# Patient Record
Sex: FEMALE | Race: OTHER
[De-identification: names, ages, dates, MRNs, and addresses within clinical notes are randomized per-mention and may not be internally consistent; named-entity substitution may affect disease eponyms.]

---

## 2018-03-15 ENCOUNTER — HOSPITAL ENCOUNTER (INPATIENT)
Dept: HOSPITAL 74 - JER | Age: 73
LOS: 3 days | Discharge: HOME | DRG: 683 | End: 2018-03-18
Attending: INTERNAL MEDICINE | Admitting: INTERNAL MEDICINE
Payer: COMMERCIAL

## 2018-03-15 VITALS — BODY MASS INDEX: 22.9 KG/M2

## 2018-03-15 DIAGNOSIS — N17.9: Primary | ICD-10-CM

## 2018-03-15 DIAGNOSIS — I95.9: ICD-10-CM

## 2018-03-15 DIAGNOSIS — E87.1: ICD-10-CM

## 2018-03-15 DIAGNOSIS — J10.1: ICD-10-CM

## 2018-03-15 DIAGNOSIS — E11.9: ICD-10-CM

## 2018-03-15 DIAGNOSIS — I10: ICD-10-CM

## 2018-03-15 DIAGNOSIS — J44.9: ICD-10-CM

## 2018-03-15 DIAGNOSIS — E86.0: ICD-10-CM

## 2018-03-15 DIAGNOSIS — E78.5: ICD-10-CM

## 2018-03-15 DIAGNOSIS — N39.0: ICD-10-CM

## 2018-03-15 DIAGNOSIS — Z87.891: ICD-10-CM

## 2018-03-15 DIAGNOSIS — M81.0: ICD-10-CM

## 2018-03-15 LAB
ALBUMIN SERPL-MCNC: 3.8 G/DL (ref 3.4–5)
ALP SERPL-CCNC: 75 U/L (ref 45–117)
ALT SERPL-CCNC: 20 U/L (ref 12–78)
ANION GAP SERPL CALC-SCNC: 6 MMOL/L (ref 8–16)
APPEARANCE UR: (no result)
APTT BLD: 30.5 SECONDS (ref 26.9–34.4)
AST SERPL-CCNC: 27 U/L (ref 15–37)
BACTERIA #/AREA URNS HPF: (no result) /HPF
BASOPHILS # BLD: 0.8 % (ref 0–2)
BILIRUB SERPL-MCNC: 0.9 MG/DL (ref 0.2–1)
BILIRUB UR STRIP.AUTO-MCNC: NEGATIVE MG/DL
BUN SERPL-MCNC: 24 MG/DL (ref 7–18)
CALCIUM SERPL-MCNC: 9.1 MG/DL (ref 8.5–10.1)
CHLORIDE SERPL-SCNC: 92 MMOL/L (ref 98–107)
CO2 SERPL-SCNC: 29 MMOL/L (ref 21–32)
COLOR UR: (no result)
CREAT SERPL-MCNC: 2 MG/DL (ref 0.55–1.02)
DEPRECATED RDW RBC AUTO: 13.2 % (ref 11.6–15.6)
EOSINOPHIL # BLD: 0.1 % (ref 0–4.5)
EPITH CASTS URNS QL MICRO: (no result) /HPF
GLUCOSE SERPL-MCNC: 100 MG/DL (ref 74–106)
HCT VFR BLD CALC: 41 % (ref 32.4–45.2)
HGB BLD-MCNC: 14 GM/DL (ref 10.7–15.3)
HYALINE CASTS URNS QL MICRO: 22 /LPF
INR BLD: 1.01 (ref 0.82–1.09)
KETONES UR QL STRIP: NEGATIVE
LEUKOCYTE ESTERASE UR QL STRIP.AUTO: (no result)
LYMPHOCYTES # BLD: 23.4 % (ref 8–40)
MCH RBC QN AUTO: 31.1 PG (ref 25.7–33.7)
MCHC RBC AUTO-ENTMCNC: 34 G/DL (ref 32–36)
MCV RBC: 91.6 FL (ref 80–96)
MONOCYTES # BLD AUTO: 10.2 % (ref 3.8–10.2)
MUCOUS THREADS URNS QL MICRO: (no result)
NEUTROPHILS # BLD: 65.5 % (ref 42.8–82.8)
NITRITE UR QL STRIP: NEGATIVE
PH BLDV: 7.36 [PH] (ref 7.32–7.42)
PH UR: 5 [PH] (ref 5–8)
PLATELET # BLD AUTO: 265 K/MM3 (ref 134–434)
PMV BLD: 6.9 FL (ref 7.5–11.1)
POTASSIUM SERPLBLD-SCNC: 4.9 MMOL/L (ref 3.5–5.1)
PROT SERPL-MCNC: 7.5 G/DL (ref 6.4–8.2)
PROT UR QL STRIP: (no result)
PROT UR QL STRIP: (no result)
PT PNL PPP: 11.4 SEC (ref 9.98–11.88)
RBC # BLD AUTO: 4.48 M/MM3 (ref 3.6–5.2)
RBC # UR STRIP: (no result) /UL
SODIUM SERPL-SCNC: 127 MMOL/L (ref 136–145)
SP GR UR: 1.01 (ref 1–1.03)
UROBILINOGEN UR STRIP-MCNC: (no result) MG/DL (ref 0.2–1)
VENOUS PC02: 46.8 MMHG (ref 38–52)
VENOUS PO2: 20.3 MMHG (ref 28–48)
WBC # BLD AUTO: 9.4 K/MM3 (ref 4–10)

## 2018-03-15 RX ADMIN — HEPARIN SODIUM SCH UNIT: 5000 INJECTION, SOLUTION INTRAVENOUS; SUBCUTANEOUS at 23:43

## 2018-03-15 NOTE — PDOC
History of Present Illness





- General


Chief Complaint: Blood Pressure Problem


Stated Complaint: FLU


Time Seen by Provider: 03/15/18 19:19


History Source: Patient, Family


Exam Limitations: No Limitations





- History of Present Illness


Initial Comments: 





03/15/18 20:03


Patient is a 73-year-old female with history of HTN, HLD, her son for complaint 

of coughing 3 days, body ache, chest tightness. States cough is productive of 

sputum, assoc with chest tightness and sob.  (+) bodyache, (+) subjective 

fever.  Went to she her pmd yesterday and was given a MDI but did not improved 

so came to the ED for eval. Denies nausea, vomiting, diarrhea.  Had the Flu 

shot 2 months ago.  (+) smoker 3-4 cig per day.  Went to urgent care today and 

had a flu swab (+) Flu A.  States she was hypotensive and so sent to the ED.   

Patient noted to be hypoxic off oxygen to 92%, and hypotensive.





PMD:  Dr. Van


PMHX:  as above


ALL:  NKDA





GENERAL/CONSTITUTIONAL: [No fever or chills. No weakness. No weight change.]


HEAD, EYES, EARS, NOSE AND THROAT: [No change in vision. No ear pain or 

discharge. No sore throat.]


CARDIOVASCULAR: [No chest pain or shortness of breath.]


RESPIRATORY: [No cough, wheezing, or hemoptysis.]


GASTROINTESTINAL: [No nausea, vomiting, diarrhea or constipation. No rectal 

bleeding.]


GENITOURINARY: [No dysuria, frequency, or change in urination.]


MUSCULOSKELETAL: (+) joint or muscle swelling or pain. No neck or back pain.]


SKIN AND BREASTS: [No rash or easy bruising.]


NEUROLOGIC: [No headache, vertigo, loss of consciousness, or loss of sensation.]


PSYCHIATRIC: [No depression or anxiety.]


ENDOCRINE: [No increased thirst. No abnormal weight change.]


HEMATOLOGIC/LYMPHATIC: [No anemia, easy bleeding, or history of blood clots.]


ALLERGIC/IMMUNOLOGIC: [No hives or skin allergy. No latex allergy.]





GENERAL: [The patient is awake, alert, and fully oriented, in no acute distress

, moist cough.]


HEAD: [Normal with no signs of trauma.]


EYES: [Pupils equal, round and reactive to light, extraocular movements intact, 

sclera anicteric, conjunctiva clear.]


ENT: [Ears normal, nares patent, oropharynx clear without exudates.  Moist 

mucous membranes.]


NECK: [Normal range of motion, supple without lymphadenopathy, JVD, or masses.]


LUNGS: [Breath sounds equal, clear to auscultation bilaterally.  No wheezes, 

and no crackles, hypoxic]


HEART: [Regular rate and rhythm, normal S1 and S2 without murmur, rub.]


ABDOMEN: [Soft, nontender, normoactive bowel sounds.  No guarding, no rebound.  

No masses.]


EXTREMITIES: [Normal range of motion, no edema.  No clubbing or cyanosis. No 

cords, erythema, or tenderness.]


NEUROLOGICAL: [Cranial nerves II through XII grossly intact.  Normal speech, 

normal gait.]


PSYCH: [Normal mood, normal affect.]


SKIN: [Warm, Dry, normal turgor, no rashes or lesions noted.]











Past History





- Past Medical History


Allergies/Adverse Reactions: 


 Allergies











Allergy/AdvReac Type Severity Reaction Status Date / Time


 


No Known Allergies Allergy   Verified 03/15/18 19:07











Home Medications: 


Ambulatory Orders





Atorvastatin Ca [Lipitor] 10 mg PO HS #30 tablet 03/11/17 


Losartan 50Mg/Hctz 12.5MG [Hyzaar -] 1 tab PO DAILY 03/15/18 








Cardiac Disorders: Yes


COPD: No


Diabetes: Yes


HTN: Yes


Hypercholesterolemia: Yes





- Suicide/Smoking/Psychosocial Hx


Smoking History: Former smoker


Have you smoked in the past 12 months: Yes


Number of Cigarettes Smoked Daily: 3


Information on smoking cessation initiated: No


'Breaking Loose' booklet given: 03/10/17


Hx Alcohol Use: No


Drug/Substance Use Hx: No


Substance Use Type: None


Hx Substance Use Treatment: No





*Physical Exam





- Vital Signs


 Last Vital Signs











Temp Pulse Resp BP Pulse Ox


 


 98.7 F   78   17   93/58   95 


 


 03/15/18 19:07  03/15/18 19:43  03/15/18 19:43  03/15/18 19:43  03/15/18 19:43














ED Treatment Course





- LABORATORY


CBC & Chemistry Diagram: 


 03/16/18 07:05





 03/16/18 07:05





- RADIOLOGY


Radiology Studies Ordered: 














 Category Date Time Status


 


 CHEST X-RAY PORTABLE* [RAD] Stat Radiology  03/15/18 19:55 Ordered














Medical Decision Making





- Medical Decision Making





03/15/18 20:09


Patient is a 73-year-old female with history of HTN, HLD, her son for complaint 

of coughing 3 days, body ache, chest tightness, noted to be hypotensive and 

hypoxic on exam. Initiate sepsis workup.


noted off oxygen to be 92%





labs reviewed noted 





03/15/18 21:40


 Laboratory Tests











  03/15/18 03/15/18 03/15/18





  20:20 20:20 20:20


 


WBC  9.4  D  


 


Hgb  14.0  


 


Hct  41.0  


 


Plt Count  265  


 


Sodium   127 L 


 


Potassium   4.9 


 


Chloride   92 L 


 


Carbon Dioxide   29 


 


Anion Gap   6 L 


 


BUN   24 H 


 


Creatinine   2.0 H 


 


Random Glucose   100 


 


Lactic Acid    1.3


 


Troponin I   














  03/15/18





  20:20


 


WBC 


 


Hgb 


 


Hct 


 


Plt Count 


 


Sodium 


 


Potassium 


 


Chloride 


 


Carbon Dioxide 


 


Anion Gap 


 


BUN 


 


Creatinine 


 


Random Glucose 


 


Lactic Acid 


 


Troponin I  < 0.02











CXR haziness at the right base, not change from prior


EKG SR rate 80, NAD, (-) ST-T wave changes, QTc 422





Patient is currently receiving fluids will admit 


for hypoxia with cough, clinically pneumonia


hyponatremia and hypotension





03/15/18 21:45


Given Rocephin 1gm and Zithromax 500mg IV





ASA 325mg po given 


tamiflu 30mg po














*DC/Admit/Observation/Transfer


Diagnosis at time of Disposition: 


 Hypoxia, Hyponatremia





Hypotension


Qualifiers:


 Hypotension type: other hypotension type Qualified Code(s): I95.89 - Other 

hypotension








- Discharge Dispostion


Condition at time of disposition: Stable


Admit: Yes





- Referrals





- Patient Instructions





- Post Discharge Activity

## 2018-03-16 LAB
ANION GAP SERPL CALC-SCNC: 10 MMOL/L (ref 8–16)
BASOPHILS # BLD: 0.4 % (ref 0–2)
BUN SERPL-MCNC: 23 MG/DL (ref 7–18)
CALCIUM SERPL-MCNC: 8 MG/DL (ref 8.5–10.1)
CHLORIDE SERPL-SCNC: 100 MMOL/L (ref 98–107)
CO2 SERPL-SCNC: 23 MMOL/L (ref 21–32)
CREAT SERPL-MCNC: 1.4 MG/DL (ref 0.55–1.02)
DEPRECATED RDW RBC AUTO: 13.1 % (ref 11.6–15.6)
EOSINOPHIL # BLD: 0.3 % (ref 0–4.5)
GLUCOSE SERPL-MCNC: 79 MG/DL (ref 74–106)
HCT VFR BLD CALC: 35.6 % (ref 32.4–45.2)
HGB BLD-MCNC: 12.2 GM/DL (ref 10.7–15.3)
LYMPHOCYTES # BLD: 44.1 % (ref 8–40)
MAGNESIUM SERPL-MCNC: 1.9 MG/DL (ref 1.8–2.4)
MCH RBC QN AUTO: 31.1 PG (ref 25.7–33.7)
MCHC RBC AUTO-ENTMCNC: 34.2 G/DL (ref 32–36)
MCV RBC: 90.8 FL (ref 80–96)
MONOCYTES # BLD AUTO: 9.3 % (ref 3.8–10.2)
NEUTROPHILS # BLD: 45.9 % (ref 42.8–82.8)
PHOSPHATE SERPL-MCNC: 2.8 MG/DL (ref 2.5–4.9)
PLATELET # BLD AUTO: 223 K/MM3 (ref 134–434)
PMV BLD: 6.8 FL (ref 7.5–11.1)
POTASSIUM SERPLBLD-SCNC: 4.1 MMOL/L (ref 3.5–5.1)
RBC # BLD AUTO: 3.92 M/MM3 (ref 3.6–5.2)
SODIUM SERPL-SCNC: 133 MMOL/L (ref 136–145)
WBC # BLD AUTO: 7.2 K/MM3 (ref 4–10)

## 2018-03-16 RX ADMIN — HEPARIN SODIUM SCH UNIT: 5000 INJECTION, SOLUTION INTRAVENOUS; SUBCUTANEOUS at 21:31

## 2018-03-16 RX ADMIN — INSULIN ASPART SCH: 100 INJECTION, SOLUTION INTRAVENOUS; SUBCUTANEOUS at 12:04

## 2018-03-16 RX ADMIN — INSULIN ASPART SCH: 100 INJECTION, SOLUTION INTRAVENOUS; SUBCUTANEOUS at 06:05

## 2018-03-16 RX ADMIN — CEFTRIAXONE SCH MLS/HR: 1 INJECTION, POWDER, FOR SOLUTION INTRAMUSCULAR; INTRAVENOUS at 09:21

## 2018-03-16 RX ADMIN — SODIUM CHLORIDE SCH MLS/HR: 9 INJECTION, SOLUTION INTRAVENOUS at 12:06

## 2018-03-16 RX ADMIN — OSELTAMIVIR PHOSPHATE SCH MG: 30 CAPSULE ORAL at 21:32

## 2018-03-16 RX ADMIN — HEPARIN SODIUM SCH UNIT: 5000 INJECTION, SOLUTION INTRAVENOUS; SUBCUTANEOUS at 09:23

## 2018-03-16 RX ADMIN — SODIUM CHLORIDE SCH MLS/HR: 9 INJECTION, SOLUTION INTRAVENOUS at 04:25

## 2018-03-16 RX ADMIN — INSULIN ASPART SCH: 100 INJECTION, SOLUTION INTRAVENOUS; SUBCUTANEOUS at 17:12

## 2018-03-16 RX ADMIN — INSULIN ASPART SCH UNITS: 100 INJECTION, SOLUTION INTRAVENOUS; SUBCUTANEOUS at 21:31

## 2018-03-16 NOTE — PN
Progress Note, Physician


Chief Complaint: 





patient seen and examined


sitting in bed says her cough is better





- Current Medication List


Current Medications: 


Active Medications





Albuterol/Ipratropium (Duoneb -)  1 amp NEB Q4H PRN


   PRN Reason: SHORTNESS OF BREATH


Heparin Sodium (Porcine) (Heparin -)  5,000 unit SQ BID [facility]


   Last Admin: 03/16/18 09:23 Dose:  5,000 unit


Ceftriaxone Sodium 1 gm/ (Dextrose)  50 mls @ 100 mls/hr IVPB DAILY [facility]


   Last Admin: 03/16/18 09:21 Dose:  100 mls/hr


Sodium Chloride (Normal Saline -)  1,000 mls @ 83 mls/hr IV ASDIR [facility]


   Last Admin: 03/16/18 12:06 Dose:  83 mls/hr


Insulin Aspart (Novolog Vial Sliding Scale -)  1 vial SQ ACHS POLI


   PRN Reason: Protocol


   Last Admin: 03/16/18 12:04 Dose:  Not Given


Oseltamivir Phosphate (Tamiflu -)  30 mg PO DAILY [facility]


   Stop: 03/21/18 09:59


   Last Admin: 03/16/18 11:00 Dose:  30 mg











- Objective


Vital Signs: 


 Vital Signs











Temperature  98.7 F   03/16/18 09:00


 


Pulse Rate  79   03/16/18 09:00


 


Respiratory Rate  20   03/16/18 09:00


 


Blood Pressure  117/63   03/16/18 09:00


 


O2 Sat by Pulse Oximetry (%)  98   03/16/18 09:00











Constitutional: Yes: Calm


Cardiovascular: Yes: Regular Rate and Rhythm, S1, S2


Respiratory: Yes: CTA Bilaterally


Neurological: Yes: Alert, Oriented


Labs: 


 CBC, BMP





 03/16/18 07:05 





 03/16/18 07:05 





 INR, PTT











INR  1.01  (0.82-1.09)   03/15/18  20:20    














Problem List





- Problems


(1) Cough


Assessment/Plan: 


r/o broncitis


legionella negative


flu swab is negative


iv abx





Code(s): R05 - COUGH   





(2) UTI (urinary tract infection)


Assessment/Plan: 


iv rocephin


awaiting final sensitivities





Code(s): N39.0 - URINARY TRACT INFECTION, SITE NOT SPECIFIED   





(3) Hyponatremia


Assessment/Plan: 


ivf - sodium trending upwards


renal sono noted- nornmal





Code(s): E87.1 - HYPO-OSMOLALITY AND HYPONATREMIA   





(4) DM2 (diabetes mellitus, type 2)


Assessment/Plan: 


hgab1c is 6.3


prediabetes


diet control


Code(s): E11.9 - TYPE 2 DIABETES MELLITUS WITHOUT COMPLICATIONS   





(5) LANDON (acute kidney injury)


Assessment/Plan: 


improving with iv hydration cr from 2.0 to 1.4


Code(s): N17.9 - ACUTE KIDNEY FAILURE, UNSPECIFIED

## 2018-03-16 NOTE — PN
Progress Note (short form)





- Note


Progress Note: 





ID Consult dictated


Acute influenza


UTI


LANDON


Await BC 


Continue ceftriaxone


tamiflu


Droplet precautions

## 2018-03-16 NOTE — CONS
DATE OF CONSULTATION:  

 

DATE OF DICTATION:  03/16/2018

 

INFECTIOUS DISEASE CONSULTATION 

 

HISTORY OF PRESENT ILLNESS:  The patient is a 73-year-old female who is evaluated for

acute influenza and urinary tract infection.

 

The patient reports a 3-day history of chest discomfort, back discomfort,

pleuritic-type chest pain, cough productive of whitish sputum and body ache.  She had

been prescribed an inhaler as an outpatient without significant improvement. 

Symptoms worsened.  She presented to an urgent care center where she was noted to be

hypotensive and hypoxemic.  A rapid influenza A screen was positive.  

 

She is presently admitted to the hospital.  She complains of chest and back pain

which is pleuritic in nature, cough productive of whitish sputum.  She has been

afebrile.  Cultures were obtained and she was empirically started on Zithromax,

ceftriaxone, and Tamiflu.  

 

She has urinary frequency and urgency.  She denies dysuria.

 

PAST MEDICAL HISTORY:  Positive for hypertension, hyperlipidemia.

 

ALLERGIES:  No known allergies.

 

MEDICATION:  Lipitor, losartan, hydrochlorothiazide.

SOCIAL HISTORY:  Lives at home with family members.  Positive active tobacco use.  

 

SYSTEMS REVIEW:  

Neurologic:  No loss of consciousness, seizure activity, or focal weakness.

Cardiac:  As per HPI.

Respiratory:  As per HPI.  

Gastrointestinal:  Negative vomiting or diarrhea.

Genitourinary:  As per HPI.

 

LABORATORY:  Lab data, white count on admission 9.4, presently 7.2.  Hematocrit 35.6,

platelet count 223.  BUN 23, creatinine 1.4.  Urinalysis:  17 white cells.  

 

Chest x-ray negative for acute infiltrate.  

 

PHYSICAL EXAMINATION:

General:  She is awake.  She is weak appearing with sinus congestion and cough,

slightly dyspneic at rest.  

Vital signs:  Temperature 98.8, blood pressure 117/57, pulse 81 regular, respirations

18 per minute.   

HEENT:  Sclerae anicteric.  Oropharynx mild injection.  

Neck:  Supple.  No palpable nodes.  

Cardiovascular:  Heart sounds S1, S2. 

Respiratory:  Lungs scattered rhonchi.  No wheezing or rales.  

Abdomen:  Soft.  Nontender.

Extremities:  Negative for edema.  

 

IMPRESSION:

1.  Acute influenza A.

2.  Urinary tract infection.  

3.  Azotemia.  

 

Await culture results.  Continue ceftriaxone for empiric coverage of urinary tract

pathogens.  Tamiflu adjusted for creatinine clearance calculated at 40 with 30 mg

p.o. b.i.d. for 5 days.  _____ precautions.  

 

Will follow.  Thank you for the kind referral.  

 

 

NILA MCCARTHY M.D.

 

BREONNA/[id number]

DD: 03/16/2018 15:32

DT: 03/16/2018 16:08

Job #:  [id number]

## 2018-03-16 NOTE — EKG
Test Reason : 

Blood Pressure : ***/*** mmHG

Vent. Rate : 080 BPM     Atrial Rate : 080 BPM

   P-R Int : 154 ms          QRS Dur : 078 ms

    QT Int : 366 ms       P-R-T Axes : 079 072 076 degrees

   QTc Int : 422 ms

 

*** POOR DATA QUALITY, INTERPRETATION MAY BE ADVERSELY AFFECTED

NORMAL SINUS RHYTHM

POSSIBLE LEFT ATRIAL ENLARGEMENT

ST ELEVATION, CONSIDER EARLY REPOLARIZATION, PERICARDITIS, OR INJURY

WHEN COMPARED WITH ECG OF 10-MAR-2017 00:50,

NO SIGNIFICANT CHANGE WAS FOUND

Confirmed by NILA GOOD MD ([id number]) on 3/16/2018 3:01:55 PM

 

Referred By:             Confirmed By:NILA GOOD MD

## 2018-03-16 NOTE — PN
Teaching Attending Note


Name of Resident: Jaja Nye





ATTENDING PHYSICIAN STATEMENT





I saw and evaluated the patient.


I reviewed the resident's note and discussed the case with the resident.


I agree with the resident's findings and plan as documented.








SUBJECTIVE:


73F smoker presents with cough, body aches, pleuritic chest pain for past 3 

days. went to urgent care where apparently flu was positive.





OBJECTIVE:


NAD


mild late exp wheezes bilaterally


mild suprapubic tenderness





Influenza panel negative


Cr 2 from 1.1 baseline


BUN 24





UA positive





ASSESSMENT AND PLAN:


73F with likely viral bronchitis , possible influenza though our panel was 

negative. complicated by LANDON, likely hypovolemic hyopnatremia


start empric tamiflu, if possible obtain report from urgent care center in AM


/hr


continue ceftriaxone for possible UTI - follow up UCx


Renal US, Urine lytes

## 2018-03-16 NOTE — CON.NEP
Consult


Consult Specialty:: nephrology


Reason for Consultation:: hyponatremia





- History of Present Illness


Chief Complaint: cough/dyspnea


History of Present Illness: 





74 y/o F with PMH HTN, HLD, DM, osteoporosis, who presents to the ED c/o cough, 

myalgias, and chest tightness over the past three days. As per pt, her sx began 

suddenly three days ago and have been a/w neck pain (not stiffness), as well as 

generalized abdominal pain. She endorses productive cough with white sputum (

without blood), as well as loss of appetite during this time.  She was found to 

have influenza A and came to ED via ambulance.  Has no history of ckd or 

hyponatremia.  Was having fevers but no vomting or diarrhea.   





- History Source


History Provided By: Patient, Medical Record


Limitations to Obtaining History: No Limitations





- Past Medical History


Cardio/Vascular: Yes: HTN





- Alcohol/Substance Use


Hx Alcohol Use: No





- Smoking History


Smoking history: Former smoker


Have you smoked in the past 12 months: Yes


Aproximately how many cigarettes per day: 3





Home Medications





- Allergies


Allergies/Adverse Reactions: 


 Allergies











Allergy/AdvReac Type Severity Reaction Status Date / Time


 


No Known Allergies Allergy   Verified 03/15/18 19:07














- Home Medications


Home Medications: 


Ambulatory Orders





Atorvastatin Ca [Lipitor] 10 mg PO HS #30 tablet 03/11/17 


Losartan 50Mg/Hctz 12.5MG [Hyzaar -] 1 tab PO DAILY 03/15/18 











Family Disease History





- Family Disease History


Family History: Unremarkable





Review of Systems





- Review of Systems


Constitutional: reports: Fever


Eyes: reports: No Symptoms


HENT: reports: No Symptoms


Neck: reports: No Symptoms


Cardiovascular: reports: Chest Pain, Shortness of Breath


Respiratory: reports: Cough, SOB


Gastrointestinal: reports: No Symptoms


Genitourinary: reports: No Symptoms


Breasts: reports: No Symptoms Reported


Musculoskeletal: reports: Back Pain, Muscle Cramps


Neurological: reports: No Symptoms


Endocrine: reports: No Symptoms


Hematology/Lymphatic: reports: No Symptoms


Psychiatric: reports: No Symptoms





Nephrology Consult





- Height


Height: 5 ft 10 in





- Weight


Weight: 160 lb





- BMI


Body Mass Index (BMI): 22.9





- Lab Results


CBC,BMP: 


 CBC, BMP





 03/16/18 07:05 





 03/16/18 07:05 








Anion Gap: 


 Anion Gap











Anion Gap  10  (8-16)   03/16/18  07:05    














- Imaging


Chest X-ray: Report Reviewed (no acute infiltrates)


Ultrasound: Report Reviewed (normal)





- Physical Examination


Vital Signs: 


 Vital Signs











Temperature  98.8 F   03/16/18 14:09


 


Pulse Rate  81   03/16/18 14:09


 


Respiratory Rate  18   03/16/18 14:09


 


Blood Pressure  117/57   03/16/18 14:09


 


O2 Sat by Pulse Oximetry (%)  98   03/16/18 09:00











Constitutional: Yes: Well Nourished, No Distress, Calm


Eyes: Yes: Conjunctiva Clear


HENT: Yes: Atraumatic, Normocephalic


Neck: Yes: Supple, Trachea Midline


Cardiovascular: Yes: Regular Rate and Rhythm


Respiratory: Yes: Regular, CTA Bilaterally


Gastrointestinal: Yes: Normal Bowel Sounds


Musculoskeletal: Yes: WNL


Extremities: Yes: WNL


Edema: No


Peripheral Pulses WNL: Yes


Wound/Incision: Yes: Clean/Dry


Neurological: Yes: Alert, Oriented


Psychiatric: Yes: Alert, Oriented





Assessment/Plan





IMPRESSION


LANDON associated with hypotension and hyponatremia; already improved with ivf


influenza testing was pos as outpatient (reportedly) but negative here


does have a urinary tract by UA


low urine osm suggestive of increased water clearance








PLAN


would continue fluids


need to clarify if droplet precautions are necessary as suggested by ID


abx for uti


follow urine cultures


would evaluate for ckd as outpatient





MV

## 2018-03-16 NOTE — HP
CHIEF COMPLAINT: flu like sx x 3 days 





PCP:





HISTORY OF PRESENT ILLNESS:





74 y/o F with PMH HTN, HLD, DM, osteoporosis, who presents to the ED c/o cough, 

myalgias, and chest tightness over the past three days. As per pt, her sx began 

suddenly three days ago and have been a/w neck pain (not stiffness), as well as 

generalized abdominal pain. She endorses productive cough with white sputum (

without blood), as well as loss of appetite during this time. Pt went to her PMD

, Dr. Van yesterday and was given a meter-dose inhaler for sx use, however 

her sx were not alleviated. For this reason, she went to an urgent care today 

for further evaluation, where she tested positive for Flu A and was recommended 

continued follow up in the ED. She also endorses subjective fever and chills. 

Denies SOB, chest pain or pressure, or changes in urinary or bowel function. 





While pt was in the ED, she was stated to be hypotensive at 93/58 (MAP 70) and 

hypoxic on RA. She was started on nonrebreather, then 2-3L via NC where she 

improved to 94-95 sat. 





ER course was notable for:


(1) Aspirin 325mg PO x 1 


(2) Ceftriaxone, azithromycin x 1 


(3) in-house Flu (-)


(4) Tamiflu 30mg x 1





Recent Travel: none





PAST MEDICAL HISTORY: as above 





PAST SURGICAL HISTORY: denies 





Social History: retired; . 


Smoking: smoked for 50 years,  2-3 cigs/day. Still smokes actively, no thoughts 

of quitting


Alcohol: denies


Drugs: denies





Family History: denies





Allergies





No Known Allergies Allergy (Verified 03/15/18 19:07)


 








HOME MEDICATIONS:


 Home Medications











 Medication  Instructions  Recorded


 


Atorvastatin Ca [Lipitor] 10 mg PO HS #30 tablet 03/11/17


 


Losartan 50Mg/Hctz 12.5MG [Hyzaar 1 tab PO DAILY 03/15/18





-]  








REVIEW OF SYSTEMS


CONSTITUTIONAL: +subjective fever, chills, loss of appetite 


Absent:  fever, chills, diaphoresis, generalized weakness, malaise, loss of 

appetite, weight change


HEENT: 


Absent:  rhinorrhea, nasal congestion, throat pain, throat swelling, difficulty 

swallowing, mouth swelling, ear pain, eye pain, visual changes


CARDIOVASCULAR: 


Absent: chest pain, syncope, palpitations, irregular heart rate, lightheadedness

, peripheral edema


RESPIRATORY: +cough 


Absent: cough, shortness of breath, dyspnea with exertion, orthopnea, wheezing, 

stridor, hemoptysis


GASTROINTESTINAL:


Absent: abdominal pain, abdominal distension, nausea, vomiting, diarrhea, 

constipation, melena, hematochezia


GENITOURINARY: 


Absent: dysuria, frequency, urgency, hesitancy, hematuria, flank pain, genital 

pain


MUSCULOSKELETAL: +myalgias 


Absent: myalgia, arthralgia, joint swelling, back pain, neck pain


SKIN: 


Absent: rash, itching, pallor


HEMATOLOGIC/IMMUNOLOGIC: 


Absent: easy bleeding, easy bruising, lymphadenopathy, frequent infections


ENDOCRINE:


Absent: unexplained weight gain, unexplained weight loss, heat intolerance, 

cold intolerance


NEUROLOGIC: 


Absent: headache, focal weakness or paresthesias, dizziness, unsteady gait, 

seizure, mental status changes, bladder or bowel incontinence


PSYCHIATRIC: 


Absent: anxiety, depression, suicidal or homicidal ideation, hallucinations.








PHYSICAL EXAMINATION


 Vital Signs 











  03/15/18 03/15/18 03/15/18





  19:07 19:43 22:42


 


Temperature 98.7 F  


 


Pulse Rate 83  


 


Pulse Rate [  78 





Radial]   


 


Respiratory 20 17 19





Rate   


 


Blood Pressure 92/57  


 


Blood Pressure  93/58 111/56





[Arm]   


 


O2 Sat by Pulse 100 95 98





Oximetry (%)   














  03/15/18





  23:47


 


Temperature 


 


Pulse Rate 


 


Pulse Rate [ 74





Radial] 


 


Respiratory 17





Rate 


 


Blood Pressure 


 


Blood Pressure 95/49





[Arm] 


 


O2 Sat by Pulse 100





Oximetry (%) 











GENERAL: Lying in bed. Awake, alert, and fully oriented, in mild distress. 


HEAD: Normal with no signs of trauma.


EYES: Pupils equal, round and reactive to light, extraocular movements intact, 

sclera anicteric, conjunctiva clear. No lid lag.


EARS, NOSE, THROAT: Ears normal, nares patent, oropharynx clear without 

exudates. 


NECK: Normal range of motion, supple without lymphadenopathy, JVD, or masses.


LUNGS: Breath sounds equal, clear to auscultation bilaterally. wheezing. no 

crackles, No accessory muscle use.


HEART: Regular rate and rhythm, normal S1 and S2 without murmur, rub or gallop.


ABDOMEN: Soft, +BS in all four quadrants. Mildly tender to palpation diffusely.


MUSCULOSKELETAL: Normal range of motion at all joints. No bony deformities or 

tenderness. .


LOWER EXTREMITIES: 2+ posterior tibial pulses, warm, well-perfused. No calf 

tenderness. No peripheral edema. 


NEUROLOGICAL:  Cranial nerves II-XII intact. Normal speech.


 Laboratory Results 











  03/15/18 03/15/18 03/15/18





  20:20 20:20 20:20


 


WBC  9.4  D  


 


RBC  4.48  


 


Hgb  14.0  


 


Hct  41.0  


 


MCV  91.6  


 


MCH  31.1  


 


MCHC  34.0  


 


RDW  13.2  


 


Plt Count  265  


 


MPV  6.9 L  


 


Neutrophils %  65.5  D  


 


Lymphocytes %  23.4  D  


 


Monocytes %  10.2  


 


Eosinophils %  0.1  D  


 


Basophils %  0.8  


 


PT with INR   11.40 


 


INR   1.01 


 


PTT (Actin FS)   30.5 


 


VBG pH    7.36


 


POC VBG pCO2    46.8


 


POC VBG pO2    20.3 L


 


Mixed VBG HCO3    26.0 H


 


Sodium   














  03/15/18 03/15/18 03/15/18





  20:20 20:20 20:20


 


WBC   


 


Mixed VBG HCO3   


 


Sodium  127 L  


 


Potassium  4.9  


 


Chloride  92 L  


 


Carbon Dioxide  29  


 


Anion Gap  6 L  


 


BUN  24 H  


 


Creatinine  2.0 H  


 


Creat Clearance w eGFR  24.29  


 


Random Glucose  100  


 


Lactic Acid   1.3 


 


Calcium  9.1  


 


Total Bilirubin  0.9  


 


AST  27  


 


ALT  20  


 


Alkaline Phosphatase  75  


 


Troponin I    < 0.02


 


Total Protein  7.5  


 


Albumin  3.8  


 


Urine Color   


 


Urine Mucus   














  03/15/18





  22:42


 


WBC 


 


RBC 


 


Hgb 


 


Hct 


 


MCV 


 


MCHC 


 


Monocytes % 


 


Eosinophils % 


 


Basophils % 


 


PT with INR 


 


INR 


 


PTT (Actin FS) 


 


VBG pH 


 


POC VBG pCO2 


 


POC VBG pO2 


 


Mixed VBG HCO3 


 


Sodium 


 


Potassium 


 


Chloride 


 


Carbon Dioxide 


 


Anion Gap 


 


BUN 


 


Creatinine 


 


Creat Clearance w eGFR 


 


Random Glucose 


 


Lactic Acid 


 


Calcium 


 


Total Bilirubin 


 


AST 


 


ALT 


 


Alkaline Phosphatase 


 


Troponin I 


 


Total Protein 


 


Albumin 


 


Urine Color  Elisa


 


Urine Appearance  Cloudy


 


Urine pH  5.0  D


 


Ur Specific Gravity  1.015


 


Urine Protein  2+ H


 


Urine Glucose (UA)  1+ H


 


Urine Ketones  Negative


 


Urine Blood  1+ H


 


Urine Nitrite  Negative


 


Urine Bilirubin  Negative


 


Urine Urobilinogen  4.0 e.u/dl H


 


Ur Leukocyte Esterase  3+ H


 


Urine WBC (Auto)  17


 


Urine RBC (Auto)  7


 


Ur Epithelial Cells  Many


 


Urine Bacteria  Rare


 


Hyaline Casts  22


 


Urine Mucus  Few





EKG: normal sinus, rate 90, QTc 422ms


CXR: no evidence of infiltrate, await official read





ASSESSMENT/PLAN:





74 y/o F with PMH HTN, HLD, DM, osteoporosis, who presents to the ED c/o cough, 

myalgias, and chest tightness over the past three days. Pt admitted to tele 

observation for hypoxia 2/2 potential influenza, URI, as well as UTI.





#Hypoxia 2/2 potential influenza


-Pt hypoxic on RA (new) - sat 91-92%, requiring nonrebreather, 02 NC


-Urgent care Flu (+), however in house Flu test (-)


-Will tx with Tamiflu 30mg PO qd for five day course regardless


-Isolation precautions


-Unlikely PNA, as infiltrates not noted on CXR. Received CAP coverage - azithro

, ceftriaxone x1 in ED. Pt is not septic currently.


-Will f/u Legionella urine Ag


-F/u blood cx, urine cx





#UTI 


-Pt sx with diffuse TTP in suprapubic region, UA with leuk esterase 3+, 17 WBCs


-Received 1 dose ceftriaxone in ED


-Will continue ceftriaxone 1g IVPB qd for 2-3 additional days





#Hypotension 2/2 decreased fluid intake


-Pt currently not hypotensive though BP 90/60's - MAP holding 70


-Will give IVF and reassess





#COPD


-Wheezing, SOB


-Started on duonebs q4h PRN for SOB


-F/u Urine cx 





#LANDON 2/2 decreased fluid intake


-Cr 2, bump up from baseline 1.1


-Gentle hydration IV NS 83 cc/hr 


-F/u Renal sono


-Urine lytes, u osm, serum osm - calculate FEna


-F/u BMP





#Hyponatremia 


-Pt appears hypovolemic


-Gentle hydration IV NS 83 cc/hr 


-Careful to not correct too quickly





#HTN- currently controlled - hypotensive


-Hold meds


-Restart on losartan -HCTZ 50-12.5mg tomorrow





#HLD


-Continue lipitor 10mg PO HS





#DM


-ISS


-BGM


-A1c





#F/E/N


IV NS 83 cc/hr gentle hydration


Monitor electrolytes, mainly Na


HTN diet 





#PPX


DVT: SCD's, early ambulation





#Dispo


observation monitoring 














Visit type





- Emergency Visit


Emergency Visit: Yes


ED Registration Date: 03/15/18


Care time: The patient presented to the Emergency Department on the above date 

and was hospitalized for further evaluation of their emergent condition.





- New Patient


This patient is new to me today: Yes


Date on this admission: 03/16/18





- Critical Care


Critical Care patient: No





Hospitalist Screening





- Colonoscopy Questionnaire


Colonoscopy Questionnaire: 





Colonoscopy Questionnaire








-   Patient:


50 - 75 years old and never had a screening colonoscopy: Unknown


History of colon or rectal polyps, or CA: Unknown


History of IBD, Crohn's disease or UC: Unknown


History of abdominal radiation therapy as a child: Unknown





-   Relative:


1 with colon or rectal CA, or polyps at age 60 or younger: Unknown


Colon or rectal CA diagnosed at age 45 or younger: Unknown


Multiple relatives with colon or rectal CA: Unknown





-   Outcome:


Screening Result: Negative Screen

## 2018-03-17 LAB
ALBUMIN SERPL-MCNC: 3.1 G/DL (ref 3.4–5)
ALP SERPL-CCNC: 63 U/L (ref 45–117)
ALT SERPL-CCNC: 18 U/L (ref 12–78)
ANION GAP SERPL CALC-SCNC: 5 MMOL/L (ref 8–16)
AST SERPL-CCNC: 23 U/L (ref 15–37)
BASOPHILS # BLD: 0.9 % (ref 0–2)
BILIRUB SERPL-MCNC: 0.5 MG/DL (ref 0.2–1)
BUN SERPL-MCNC: 17 MG/DL (ref 7–18)
CALCIUM SERPL-MCNC: 8.4 MG/DL (ref 8.5–10.1)
CHLORIDE SERPL-SCNC: 104 MMOL/L (ref 98–107)
CO2 SERPL-SCNC: 27 MMOL/L (ref 21–32)
CREAT SERPL-MCNC: 1.1 MG/DL (ref 0.55–1.02)
DEPRECATED RDW RBC AUTO: 13.2 % (ref 11.6–15.6)
EOSINOPHIL # BLD: 0.6 % (ref 0–4.5)
GLUCOSE SERPL-MCNC: 83 MG/DL (ref 74–106)
HCT VFR BLD CALC: 35.2 % (ref 32.4–45.2)
HGB BLD-MCNC: 11.9 GM/DL (ref 10.7–15.3)
LYMPHOCYTES # BLD: 46.8 % (ref 8–40)
MCH RBC QN AUTO: 31.1 PG (ref 25.7–33.7)
MCHC RBC AUTO-ENTMCNC: 33.9 G/DL (ref 32–36)
MCV RBC: 91.9 FL (ref 80–96)
MONOCYTES # BLD AUTO: 8.3 % (ref 3.8–10.2)
NEUTROPHILS # BLD: 43.4 % (ref 42.8–82.8)
PLATELET # BLD AUTO: 257 K/MM3 (ref 134–434)
PMV BLD: 7.2 FL (ref 7.5–11.1)
POTASSIUM SERPLBLD-SCNC: 4.8 MMOL/L (ref 3.5–5.1)
PROT SERPL-MCNC: 6.2 G/DL (ref 6.4–8.2)
RBC # BLD AUTO: 3.83 M/MM3 (ref 3.6–5.2)
SODIUM SERPL-SCNC: 136 MMOL/L (ref 136–145)
WBC # BLD AUTO: 6.7 K/MM3 (ref 4–10)

## 2018-03-17 RX ADMIN — SODIUM CHLORIDE SCH MLS/HR: 9 INJECTION, SOLUTION INTRAVENOUS at 15:32

## 2018-03-17 RX ADMIN — INSULIN ASPART SCH: 100 INJECTION, SOLUTION INTRAVENOUS; SUBCUTANEOUS at 22:55

## 2018-03-17 RX ADMIN — INSULIN ASPART SCH: 100 INJECTION, SOLUTION INTRAVENOUS; SUBCUTANEOUS at 17:55

## 2018-03-17 RX ADMIN — OSELTAMIVIR PHOSPHATE SCH MG: 30 CAPSULE ORAL at 09:17

## 2018-03-17 RX ADMIN — CEFTRIAXONE SCH MLS/HR: 1 INJECTION, POWDER, FOR SOLUTION INTRAMUSCULAR; INTRAVENOUS at 09:17

## 2018-03-17 RX ADMIN — INSULIN ASPART SCH: 100 INJECTION, SOLUTION INTRAVENOUS; SUBCUTANEOUS at 11:26

## 2018-03-17 RX ADMIN — OSELTAMIVIR PHOSPHATE SCH MG: 30 CAPSULE ORAL at 22:51

## 2018-03-17 RX ADMIN — INSULIN ASPART SCH: 100 INJECTION, SOLUTION INTRAVENOUS; SUBCUTANEOUS at 06:40

## 2018-03-17 RX ADMIN — HEPARIN SODIUM SCH UNIT: 5000 INJECTION, SOLUTION INTRAVENOUS; SUBCUTANEOUS at 22:51

## 2018-03-17 RX ADMIN — HEPARIN SODIUM SCH UNIT: 5000 INJECTION, SOLUTION INTRAVENOUS; SUBCUTANEOUS at 09:17

## 2018-03-17 NOTE — PN
Progress Note (short form)





- Note


Progress Note: 





RENAL


Says she feels better and wants to leave


not coughing


no nausea or vomiting


no diarrhea


able to eat


says she was not eating or drinking before





 Last Vital Signs











Temp Pulse Resp BP Pulse Ox


 


 99 F   72   19   127/68   98 


 


 03/17/18 06:00  03/17/18 06:00  03/17/18 06:00  03/17/18 06:00  03/16/18 21:00








lungs essentially clear, rare crackles


cvs s1s2 rr


abd soft


ext no edema


neuro a+ox3


skin no rashes or lesions noted





 Current Medications











Generic Name Dose Route Start Last Admin





  Trade Name Freq  PRN Reason Stop Dose Admin


 


Albuterol/Ipratropium  1 amp  03/16/18 04:21  





  Duoneb -  NEB   





  Q4H PRN   





  SHORTNESS OF BREATH   


 


Heparin Sodium (Porcine)  5,000 unit  03/15/18 23:00  03/17/18 09:17





  Heparin -  SQ   5,000 unit





  BID POLI   Administration


 


Ceftriaxone Sodium 1 gm/  50 mls @ 100 mls/hr  03/16/18 10:00  03/17/18 09:17





  Dextrose  IVPB   100 mls/hr





  DAILY POLI   Administration


 


Sodium Chloride  1,000 mls @ 83 mls/hr  03/16/18 04:21  03/16/18 12:06





  Normal Saline -  IV   83 mls/hr





  ASDIR POLI   Administration


 


Insulin Aspart  1 vial  03/16/18 07:00  03/17/18 06:40





  Novolog Vial Sliding Scale -  SQ   Not Given





  ACHS POLI   





  Protocol   


 


Oseltamivir Phosphate  30 mg  03/16/18 22:00  03/17/18 09:17





  Tamiflu -  PO  03/21/18 21:59  30 mg





  BID POLI   Administration








 


 CBC, BMP





 03/17/18 06:59 





 03/17/18 06:59 














IMPRESSION


LANDON associated with hypotension and hyponatremia; already improved with ivf


influenza testing was pos as outpatient (reportedly) but negative here


does have a urinary tract infection  by UA


hyponatremia and landon improved








PLAN


can dc fluids and feed


abx for uti


follow urine cultures


would evaluate for ckd as outpatient


if she needs tamiflu will need to adjust dose

## 2018-03-17 NOTE — PN
Physical Exam: 


SUBJECTIVE: Patient seen and examined. She feels better. She denies cough, SOB.








OBJECTIVE:





 Vital Signs











 Period  Temp  Pulse  Resp  BP Sys/Muñoz  Pulse Ox


 


 Last 24 Hr  98.0 F-99 F  69-81  18-19  106-127/53-96  96-98











GENERAL: The patient is awake, alert, and fully oriented, in no acute distress.


LUNGS: Breath sounds equal, clear to auscultation bilaterally, no wheezes, no 

crackles, no accessory muscle use. 


HEART: Regular rate and rhythm, S1, S2 without murmur, rub or gallop.


ABDOMEN: Soft, nontender, nondistended, normoactive bowel sounds, no guarding, 

no rebound, no hepatosplenomegaly, no masses.


EXTREMITIES: 2+ pulses, warm, well-perfused, no edema. 











 Laboratory Results - last 24 hr











  03/16/18 03/16/18 03/16/18





  12:03 17:10 21:27


 


WBC   


 


RBC   


 


Hgb   


 


Hct   


 


MCV   


 


MCH   


 


MCHC   


 


RDW   


 


Plt Count   


 


MPV   


 


Neutrophils %   


 


Lymphocytes %   


 


Monocytes %   


 


Eosinophils %   


 


Basophils %   


 


Sodium   


 


Potassium   


 


Chloride   


 


Carbon Dioxide   


 


Anion Gap   


 


BUN   


 


Creatinine   


 


Creat Clearance w eGFR   


 


POC Glucometer  91  95  154


 


Random Glucose   


 


Calcium   


 


Total Bilirubin   


 


AST   


 


ALT   


 


Alkaline Phosphatase   


 


Total Protein   


 


Albumin   














  03/17/18 03/17/18 03/17/18





  06:08 06:59 06:59


 


WBC   6.7 


 


RBC   3.83 


 


Hgb   11.9 


 


Hct   35.2 


 


MCV   91.9 


 


MCH   31.1 


 


MCHC   33.9 


 


RDW   13.2 


 


Plt Count   257 


 


MPV   7.2 L 


 


Neutrophils %   43.4 


 


Lymphocytes %   46.8 H 


 


Monocytes %   8.3 


 


Eosinophils %   0.6  D 


 


Basophils %   0.9 


 


Sodium    136


 


Potassium    4.8


 


Chloride    104


 


Carbon Dioxide    27


 


Anion Gap    5 L


 


BUN    17


 


Creatinine    1.1 H


 


Creat Clearance w eGFR    48.42


 


POC Glucometer  93  


 


Random Glucose    83


 


Calcium    8.4 L


 


Total Bilirubin    0.5  D


 


AST    23


 


ALT    18


 


Alkaline Phosphatase    63


 


Total Protein    6.2 L


 


Albumin    3.1 L














  03/17/18





  11:03


 


WBC 


 


RBC 


 


Hgb 


 


Hct 


 


MCV 


 


MCH 


 


MCHC 


 


RDW 


 


Plt Count 


 


MPV 


 


Neutrophils % 


 


Lymphocytes % 


 


Monocytes % 


 


Eosinophils % 


 


Basophils % 


 


Sodium 


 


Potassium 


 


Chloride 


 


Carbon Dioxide 


 


Anion Gap 


 


BUN 


 


Creatinine 


 


Creat Clearance w eGFR 


 


POC Glucometer  97


 


Random Glucose 


 


Calcium 


 


Total Bilirubin 


 


AST 


 


ALT 


 


Alkaline Phosphatase 


 


Total Protein 


 


Albumin 








Active Medications











Generic Name Dose Route Start Last Admin





  Trade Name Freq  PRN Reason Stop Dose Admin


 


Albuterol/Ipratropium  1 amp  03/16/18 04:21  





  Duoneb -  NEB   





  Q4H PRN   





  SHORTNESS OF BREATH   


 


Heparin Sodium (Porcine)  5,000 unit  03/15/18 23:00  03/17/18 09:17





  Heparin -  SQ   5,000 unit





  BID POLI   Administration


 


Ceftriaxone Sodium 1 gm/  50 mls @ 100 mls/hr  03/16/18 10:00  03/17/18 09:17





  Dextrose  IVPB   100 mls/hr





  DAILY POLI   Administration


 


Sodium Chloride  1,000 mls @ 83 mls/hr  03/16/18 04:21  03/16/18 12:06





  Normal Saline -  IV   83 mls/hr





  ASDIR POLI   Administration


 


Insulin Aspart  1 vial  03/16/18 07:00  03/17/18 11:26





  Novolog Vial Sliding Scale -  SQ   Not Given





  ACHS POLI   





  Protocol   


 


Oseltamivir Phosphate  30 mg  03/16/18 22:00  03/17/18 09:17





  Tamiflu -  PO  03/21/18 21:59  30 mg





  BID POLI   Administration











ASSESSMENT/PLAN:


1. Acute kidney injury secondary to dehydration


   - Improved with IV fluid


   - Discontinue IV fluid and repeat BUN, creatinine in AM


2. Hypovolemic hyponatremia


   - Improved


3. Influenza A


   - Complete course of Tamiflu


4. UTI


   - Continue Rocephin (day 2)   


5. Type 2 DM


   - Continue Novolog sliding scale


6. HTN


   - Meds held secondary to hypotension


7. Hyperlipidemia





Visit type





- Emergency Visit


Emergency Visit: Yes


ED Registration Date: 03/15/18


Care time: The patient presented to the Emergency Department on the above date 

and was hospitalized for further evaluation of their emergent condition.





- New Patient


This patient is new to me today: Yes


Date on this admission: 03/17/18





- Critical Care


Critical Care patient: No





- Discharge Referral


Referred to [facility] No

## 2018-03-18 VITALS — HEART RATE: 84 BPM | DIASTOLIC BLOOD PRESSURE: 73 MMHG | TEMPERATURE: 98.4 F | SYSTOLIC BLOOD PRESSURE: 127 MMHG

## 2018-03-18 LAB
ANION GAP SERPL CALC-SCNC: 9 MMOL/L (ref 8–16)
BUN SERPL-MCNC: 12 MG/DL (ref 7–18)
CALCIUM SERPL-MCNC: 9.6 MG/DL (ref 8.5–10.1)
CHLORIDE SERPL-SCNC: 104 MMOL/L (ref 98–107)
CO2 SERPL-SCNC: 26 MMOL/L (ref 21–32)
CREAT SERPL-MCNC: 1.1 MG/DL (ref 0.55–1.02)
GLUCOSE SERPL-MCNC: 80 MG/DL (ref 74–106)
POTASSIUM SERPLBLD-SCNC: 5.3 MMOL/L (ref 3.5–5.1)
SODIUM SERPL-SCNC: 139 MMOL/L (ref 136–145)

## 2018-03-18 RX ADMIN — OSELTAMIVIR PHOSPHATE SCH MG: 30 CAPSULE ORAL at 09:56

## 2018-03-18 RX ADMIN — INSULIN ASPART SCH: 100 INJECTION, SOLUTION INTRAVENOUS; SUBCUTANEOUS at 06:35

## 2018-03-18 RX ADMIN — CEFTRIAXONE SCH MLS/HR: 1 INJECTION, POWDER, FOR SOLUTION INTRAMUSCULAR; INTRAVENOUS at 09:55

## 2018-03-18 RX ADMIN — HEPARIN SODIUM SCH UNIT: 5000 INJECTION, SOLUTION INTRAVENOUS; SUBCUTANEOUS at 09:56

## 2018-03-18 NOTE — PN
Progress Note (short form)





- Note


Progress Note: 





RENAL


Says she feels better and wants to leave


not coughing


no nausea or vomiting


no diarrhea


able to eat








 


 Last Vital Signs











Temp Pulse Resp BP Pulse Ox


 


 98.9 F   80   16   148/83   95 


 


 03/18/18 06:00  03/18/18 06:00  03/18/18 06:00  03/18/18 06:00  03/17/18 21:00

















lungs essentially clear


cvs s1s2 rr


abd soft


ext no edema


neuro a+ox3


skin no rashes or lesions noted





 





 CBC, BMP





 03/17/18 06:59 





 03/18/18 06:57 





 Current Medications











Generic Name Dose Route Start Last Admin





  Trade Name Freq  PRN Reason Stop Dose Admin


 


Albuterol/Ipratropium  1 amp  03/16/18 04:21  





  Duoneb -  NEB   





  Q4H PRN   





  SHORTNESS OF BREATH   


 


Heparin Sodium (Porcine)  5,000 unit  03/15/18 23:00  03/18/18 09:56





  Heparin -  SQ   5,000 unit





  BID POLI   Administration


 


Ceftriaxone Sodium 1 gm/  50 mls @ 100 mls/hr  03/16/18 10:00  03/18/18 09:55





  Dextrose  IVPB   100 mls/hr





  DAILY POLI   Administration


 


Insulin Aspart  1 vial  03/16/18 07:00  03/18/18 06:35





  Novolog Vial Sliding Scale -  SQ   Not Given





  ACHS POLI   





  Protocol   


 


Oseltamivir Phosphate  30 mg  03/16/18 22:00  03/18/18 09:56





  Tamiflu -  PO  03/21/18 21:59  30 mg





  BID POLI   Administration











 


 





IMPRESSION


LANDON associated with hypotension and hyponatremia; already improved with ivf


influenza testing was pos as outpatient (reportedly) but negative here


does have a urinary tract infection  by UA


hyponatremia and landon improved


probably does have some degree of ckd








PLAN


abx for uti


would evaluate for ckd as outpatient


continue current management





MV

## 2018-03-18 NOTE — DS
Physical Exam: 


SUBJECTIVE: Patient seen and examined








OBJECTIVE:





 Vital Signs











 Period  Temp  Pulse  Resp  BP Sys/Muñoz  Pulse Ox


 


 Last 24 Hr  98.4 F-98.9 F  69-84  16-22  125-148/62-83  95








PHYSICAL EXAM





GENERAL: The patient is awake, alert, and fully oriented, in no acute distress.


HEAD: Normal with no signs of trauma.


EYES: PERRL, extraocular movements intact, sclera anicteric, conjunctiva clear. 


ENT: Ears normal, nares patent, oropharynx clear without exudates, moist mucous 

membranes.


NECK: Trachea midline, full range of motion, supple. 


LUNGS: Breath sounds equal, clear to auscultation bilaterally, no wheezes, no 

crackles, no accessory muscle use. 


HEART: Regular rate and rhythm, S1, S2 without murmur, rub or gallop.


ABDOMEN: Soft, nontender, nondistended, normoactive bowel sounds, no guarding, 

no rebound, no hepatosplenomegaly, no masses.


EXTREMITIES: 2+ pulses, warm, well-perfused, no edema. 


NEUROLOGICAL: Cranial nerves II through XII grossly intact. Normal speech, gait 

not observed.


PSYCH: Normal mood, normal affect.


SKIN: Warm, dry, normal turgor, no rashes or lesions noted.





LABS


 Laboratory Results - last 24 hr











  03/17/18 03/17/18 03/18/18





  17:54 22:53 06:33


 


Sodium   


 


Potassium   


 


Chloride   


 


Carbon Dioxide   


 


Anion Gap   


 


BUN   


 


Creatinine   


 


POC Glucometer  124  95  83


 


Random Glucose   


 


Calcium   














  03/18/18





  06:57


 


Sodium  139


 


Potassium  5.3 H


 


Chloride  104


 


Carbon Dioxide  26


 


Anion Gap  9


 


BUN  12


 


Creatinine  1.1 H


 


POC Glucometer 


 


Random Glucose  80


 


Calcium  9.6











HOSPITAL COURSE:





Date of Admission:03/15/18





Date of Discharge: 03/18/18











Minutes to complete discharge: 30





Discharge Summary


Reason For Visit: HYPONATREMIA,HYPOXIA,HYPOTENSION


Current Active Problems





LANDON (acute kidney injury) (Acute)


Cough (Acute)


Hyponatremia (Acute)


Hypotension (Acute)


Hypoxia (Acute)


UTI (urinary tract infection) (Acute)








Condition: Improved





- Instructions


Diet, Activity, Other Instructions: 


You were admitted for dehydration with low sodium from the flu and a urinary 

tract infection. You were treated with IV fluid, Tamiflu for the flu, and 

Rocephin for 3 days for the urinary tract infection. You have 6 more doses of 

Tamiflu which you can  at [place] [facility]. You may resume your usual 

diet and activity. Please schedule an appointment with your primary care 

physician, Dr. Van, this week. If you develop fever, chills, shortness of 

breath, please call his office or return to the ER. 


Referrals: 


Kiel Van MD [Primary Care Provider] - 1 Week


Disposition: HOME





- Home Medications


Comprehensive Discharge Medication List: 


Ambulatory Orders





Atorvastatin Ca [Lipitor] 10 mg PO HS #30 tablet 03/11/17 


Losartan 50Mg/Hctz 12.5MG [Hyzaar -] 1 tab PO DAILY 03/15/18 


Oseltamivir Phosphate [Tamiflu -] 30 mg PO BID #6 capsule 03/18/18 








This patient is new to me today: No


Emergency Visit: Yes


ED Registration Date: 03/15/18


Care time: The patient presented to the Emergency Department on the above date 

and was hospitalized for further evaluation of their emergent condition.


Critical Care patient: No





- Discharge Referral


Referred to [facility] No

## 2018-05-26 ENCOUNTER — HOSPITAL ENCOUNTER (OUTPATIENT)
Dept: HOSPITAL 74 - JER | Age: 73
Setting detail: OBSERVATION
LOS: 3 days | Discharge: HOME | End: 2018-05-29
Attending: FAMILY MEDICINE | Admitting: INTERNAL MEDICINE
Payer: COMMERCIAL

## 2018-05-26 VITALS — BODY MASS INDEX: 22.9 KG/M2

## 2018-05-26 DIAGNOSIS — F17.200: ICD-10-CM

## 2018-05-26 DIAGNOSIS — R05: ICD-10-CM

## 2018-05-26 DIAGNOSIS — F41.9: ICD-10-CM

## 2018-05-26 DIAGNOSIS — N39.0: ICD-10-CM

## 2018-05-26 DIAGNOSIS — R07.89: Primary | ICD-10-CM

## 2018-05-26 DIAGNOSIS — E87.1: ICD-10-CM

## 2018-05-26 DIAGNOSIS — M54.9: ICD-10-CM

## 2018-05-26 DIAGNOSIS — R00.2: ICD-10-CM

## 2018-05-26 DIAGNOSIS — F32.9: ICD-10-CM

## 2018-05-26 DIAGNOSIS — N17.9: ICD-10-CM

## 2018-05-26 DIAGNOSIS — K29.70: ICD-10-CM

## 2018-05-26 DIAGNOSIS — E11.22: ICD-10-CM

## 2018-05-26 DIAGNOSIS — N18.3: ICD-10-CM

## 2018-05-26 DIAGNOSIS — I12.9: ICD-10-CM

## 2018-05-26 DIAGNOSIS — E78.5: ICD-10-CM

## 2018-05-26 DIAGNOSIS — R91.8: ICD-10-CM

## 2018-05-26 DIAGNOSIS — R09.02: ICD-10-CM

## 2018-05-26 DIAGNOSIS — I95.9: ICD-10-CM

## 2018-05-26 LAB
ALBUMIN SERPL-MCNC: 3.9 G/DL (ref 3.4–5)
ALBUMIN SERPL-MCNC: 4 G/DL (ref 3.4–5)
ALP SERPL-CCNC: 67 U/L (ref 45–117)
ALP SERPL-CCNC: 69 U/L (ref 45–117)
ALT SERPL-CCNC: 21 U/L (ref 12–78)
ALT SERPL-CCNC: 22 U/L (ref 12–78)
ANION GAP SERPL CALC-SCNC: 7 MMOL/L (ref 8–16)
ANION GAP SERPL CALC-SCNC: 7 MMOL/L (ref 8–16)
AST SERPL-CCNC: 26 U/L (ref 15–37)
AST SERPL-CCNC: 26 U/L (ref 15–37)
BASOPHILS # BLD: 0.7 % (ref 0–2)
BASOPHILS # BLD: 1.1 % (ref 0–2)
BILIRUB SERPL-MCNC: 0.8 MG/DL (ref 0.2–1)
BILIRUB SERPL-MCNC: 0.8 MG/DL (ref 0.2–1)
BUN SERPL-MCNC: 28 MG/DL (ref 7–18)
BUN SERPL-MCNC: 28 MG/DL (ref 7–18)
CALCIUM SERPL-MCNC: 10.2 MG/DL (ref 8.5–10.1)
CALCIUM SERPL-MCNC: 10.3 MG/DL (ref 8.5–10.1)
CHLORIDE SERPL-SCNC: 101 MMOL/L (ref 98–107)
CHLORIDE SERPL-SCNC: 101 MMOL/L (ref 98–107)
CO2 SERPL-SCNC: 27 MMOL/L (ref 21–32)
CO2 SERPL-SCNC: 27 MMOL/L (ref 21–32)
CREAT SERPL-MCNC: 1.6 MG/DL (ref 0.55–1.02)
CREAT SERPL-MCNC: 1.7 MG/DL (ref 0.55–1.02)
DEPRECATED RDW RBC AUTO: 13.4 % (ref 11.6–15.6)
DEPRECATED RDW RBC AUTO: 13.7 % (ref 11.6–15.6)
EOSINOPHIL # BLD: 0.8 % (ref 0–4.5)
EOSINOPHIL # BLD: 2.5 % (ref 0–4.5)
GLUCOSE SERPL-MCNC: 81 MG/DL (ref 74–106)
GLUCOSE SERPL-MCNC: 85 MG/DL (ref 74–106)
HCT VFR BLD CALC: 37.9 % (ref 32.4–45.2)
HCT VFR BLD CALC: 38.3 % (ref 32.4–45.2)
HGB BLD-MCNC: 12.7 GM/DL (ref 10.7–15.3)
HGB BLD-MCNC: 13 GM/DL (ref 10.7–15.3)
INR BLD: 1.01 (ref 0.82–1.09)
INR BLD: 1.01 (ref 0.82–1.09)
LYMPHOCYTES # BLD: 37.6 % (ref 8–40)
LYMPHOCYTES # BLD: 45.8 % (ref 8–40)
MAGNESIUM SERPL-MCNC: 2.1 MG/DL (ref 1.8–2.4)
MAGNESIUM SERPL-MCNC: 2.2 MG/DL (ref 1.8–2.4)
MCH RBC QN AUTO: 30.7 PG (ref 25.7–33.7)
MCH RBC QN AUTO: 31 PG (ref 25.7–33.7)
MCHC RBC AUTO-ENTMCNC: 33.4 G/DL (ref 32–36)
MCHC RBC AUTO-ENTMCNC: 33.8 G/DL (ref 32–36)
MCV RBC: 91.6 FL (ref 80–96)
MCV RBC: 91.9 FL (ref 80–96)
MONOCYTES # BLD AUTO: 4.5 % (ref 3.8–10.2)
MONOCYTES # BLD AUTO: 7 % (ref 3.8–10.2)
NEUTROPHILS # BLD: 43.6 % (ref 42.8–82.8)
NEUTROPHILS # BLD: 56.4 % (ref 42.8–82.8)
PLATELET # BLD AUTO: 284 K/MM3 (ref 134–434)
PLATELET # BLD AUTO: 293 K/MM3 (ref 134–434)
PMV BLD: 6.5 FL (ref 7.5–11.1)
PMV BLD: 6.9 FL (ref 7.5–11.1)
POTASSIUM SERPLBLD-SCNC: 4.8 MMOL/L (ref 3.5–5.1)
POTASSIUM SERPLBLD-SCNC: 4.9 MMOL/L (ref 3.5–5.1)
PROT SERPL-MCNC: 7.4 G/DL (ref 6.4–8.2)
PROT SERPL-MCNC: 7.6 G/DL (ref 6.4–8.2)
PT PNL PPP: 11.4 SEC (ref 9.7–13)
PT PNL PPP: 11.4 SEC (ref 9.7–13)
RBC # BLD AUTO: 4.13 M/MM3 (ref 3.6–5.2)
RBC # BLD AUTO: 4.18 M/MM3 (ref 3.6–5.2)
SODIUM SERPL-SCNC: 135 MMOL/L (ref 136–145)
SODIUM SERPL-SCNC: 135 MMOL/L (ref 136–145)
WBC # BLD AUTO: 5.9 K/MM3 (ref 4–10)
WBC # BLD AUTO: 6.3 K/MM3 (ref 4–10)

## 2018-05-26 PROCEDURE — A9502 TC99M TETROFOSMIN: HCPCS

## 2018-05-26 PROCEDURE — 3E0337Z INTRODUCTION OF ELECTROLYTIC AND WATER BALANCE SUBSTANCE INTO PERIPHERAL VEIN, PERCUTANEOUS APPROACH: ICD-10-PCS | Performed by: FAMILY MEDICINE

## 2018-05-26 PROCEDURE — C1887 CATHETER, GUIDING: HCPCS

## 2018-05-26 PROCEDURE — 3E0F7GC INTRODUCTION OF OTHER THERAPEUTIC SUBSTANCE INTO RESPIRATORY TRACT, VIA NATURAL OR ARTIFICIAL OPENING: ICD-10-PCS | Performed by: FAMILY MEDICINE

## 2018-05-26 PROCEDURE — G0378 HOSPITAL OBSERVATION PER HR: HCPCS

## 2018-05-26 RX ADMIN — SODIUM CHLORIDE SCH MLS/HR: 9 INJECTION, SOLUTION INTRAVENOUS at 14:04

## 2018-05-26 RX ADMIN — ATORVASTATIN CALCIUM SCH: 10 TABLET, FILM COATED ORAL at 22:28

## 2018-05-26 NOTE — EKG
Test Reason : 

Blood Pressure : ***/*** mmHG

Vent. Rate : 077 BPM     Atrial Rate : 077 BPM

   P-R Int : 172 ms          QRS Dur : 080 ms

    QT Int : 366 ms       P-R-T Axes : 080 070 069 degrees

   QTc Int : 414 ms

 

NORMAL SINUS RHYTHM

ST ELEVATION, CONSIDER EARLY REPOLARIZATION

BORDERLINE ECG

WHEN COMPARED WITH ECG OF 15-MAR-2018 20:39,

NO SIGNIFICANT CHANGE WAS FOUND

Confirmed by NILDA QUARLES, THAO ([id number]) on 5/26/2018 5:32:51 PM

 

Referred By: AYAKA KITCHEN           Confirmed By:TAHO MUNGUIA MD

## 2018-05-26 NOTE — EKG
Test Reason : 

Blood Pressure : ***/*** mmHG

Vent. Rate : 075 BPM     Atrial Rate : 075 BPM

   P-R Int : 178 ms          QRS Dur : 082 ms

    QT Int : 376 ms       P-R-T Axes : 082 074 070 degrees

   QTc Int : 419 ms

 

NORMAL SINUS RHYTHM

BIATRIAL ENLARGEMENT

ST ELEVATION, CONSIDER EARLY REPOLARIZATION

ABNORMAL ECG

WHEN COMPARED WITH ECG OF 15-MAR-2018 20:39,

NO SIGNIFICANT CHANGE WAS FOUND

Confirmed by NILDA QUARLES, THAO ([id number]) on 5/26/2018 5:32:35 PM

 

Referred By:             Confirmed By:THAO MUNGUIA MD

## 2018-05-26 NOTE — HP
PCP: Kiel Van





CHIEF COMPLAINT: Chest pain





HISTORY OF PRESENT ILLNESS: This is a 73 year old woman who comes to the ED 

complaining of chest pain and palpitations. She was awakened from sleep by the 

symptoms. Pain was worse with inspiration and did not radiate. She denies SOB, 

nausea, diaphoresis. She took baby aspirin at home before coming to the ED. 

Currently she has no pain.





PAST MEDICAL HISTORY


HTN


Hyperlipidemia





PAST SURGICAL HISTORY


Denies





Allergies


No Known Allergies Allergy (Verified 05/26/18 08:20)


 





 Home Medications











 Medication  Instructions  Recorded


 


Atorvastatin Ca [Lipitor] 10 mg PO HS #30 tablet 03/11/17


 


Losartan 50Mg/Hctz 12.5MG [Hyzaar 1 tab PO DAILY 03/15/18





-]  








Social History


Smoking: Smokes several cigarettes per day


Alcohol: Denies


Drugs: Denies





Recent Travel: No





Family History


Unremarkable








REVIEW OF SYSTEMS


CONSTITUTIONAL: Absent:  fever, chills, diaphoresis, generalized weakness, 

malaise, loss of appetite, weight change


HEENT: Absent:  rhinorrhea, nasal congestion, throat pain, throat swelling, 

difficulty swallowing, mouth swelling, ear pain, eye pain, visual changes


CARDIOVASCULAR: Present: chest pain, palpitations.  Absent: syncope, 

lightheadedness, peripheral edema


RESPIRATORY: Absent: cough, shortness of breath, dyspnea with exertion, 

orthopnea, wheezing, stridor, hemoptysis


GASTROINTESTINAL: Absent: abdominal pain, abdominal distension, nausea, vomiting

, diarrhea, constipation, melena, hematochezia


GENITOURINARY: Absent: dysuria, frequency, urgency, hesitancy, hematuria, flank 

pain


MUSCULOSKELETAL: Absent: myalgia, arthralgia, joint swelling, back pain, neck 

pain


SKIN: Absent: rash, itching, pallor


HEMATOLOGIC/IMMUNOLOGIC: Absent: easy bleeding, easy bruising, lymphadenopathy, 

frequent infections


ENDOCRINE: Absent: unexplained weight gain, unexplained weight loss, heat 

intolerance, cold intolerance


NEUROLOGIC: Absent: headache, focal weakness, paresthesias, dizziness, unsteady 

gait, seizure, mental status changes, bladder or bowel incontinence


PSYCHIATRIC: Absent: anxiety, depression, suicidal or homicidal ideation, 

hallucinations.








PHYSICAL EXAMINATION


 Vital Signs - 24 hr











  05/26/18 05/26/18 05/26/18





  07:09 07:46 10:49


 


Temperature 97.7 F  98.0 F


 


Pulse Rate 81  


 


Pulse Rate [   82





Right]   


 


Respiratory 19  18





Rate   


 


Blood Pressure 134/66  


 


Blood Pressure   128/77





[Right Arm]   


 


O2 Sat by Pulse 9 L 99 95





Oximetry (%)   














  05/26/18





  12:28


 


Temperature 98.4 F


 


Pulse Rate 83


 


Pulse Rate [ 





Right] 


 


Respiratory 18





Rate 


 


Blood Pressure 137/67


 


Blood Pressure 





[Right Arm] 


 


O2 Sat by Pulse 95





Oximetry (%) 











GENERAL: Awake, alert, and fully oriented, in no acute distress.


HEAD: Normal with no signs of trauma.


EYES: Pupils equal, round and reactive to light, extraocular movements intact, 

sclera anicteric, conjunctiva clear. 


EARS, NOSE, THROAT: Ears normal, nares patent, oropharynx clear without 

exudates. Moist mucous membranes.


NECK: Normal range of motion, supple without lymphadenopathy, JVD, or masses.


LUNGS: Breath sounds equal, clear to auscultation bilaterally. No wheezes, and 

no crackles. No accessory muscle use.


HEART: Regular rate and rhythm, normal S1 and S2 without murmur, rub or gallop.


ABDOMEN: Soft, nontender, not distended, normoactive bowel sounds, no guarding, 

no rebound, no masses. No hepatomegaly or splenomegaly. 


MUSCULOSKELETAL: Normal range of motion at all joints. No bony deformities or 

tenderness. No CVA tenderness.


UPPER EXTREMITIES: 2+ pulses, warm, well-perfused. No cyanosis. No clubbing. No 

peripheral edema.


LOWER EXTREMITIES: 2+ pulses, warm, well-perfused. No calf tenderness. No 

peripheral edema. 


NEUROLOGICAL:  Cranial nerves II-XII intact. Normal speech. Gait not observed.


PSYCHIATRIC: Cooperative. Good eye contact. Appropriate mood and affect.


SKIN: Warm, dry, normal turgor, no rashes or lesions noted, normal capillary 

refill. 








 Laboratory Results - last 24 hr











  05/26/18 05/26/18 05/26/18





  07:30 07:30 07:30


 


WBC  5.9  


 


RBC  4.18  


 


Hgb  13.0  


 


Hct  38.3  


 


MCV  91.6  


 


MCH  31.0  


 


MCHC  33.8  


 


RDW  13.4  


 


Plt Count  293  


 


MPV  6.5 L  


 


Neutrophils %  43.6  


 


Lymphocytes %  45.8 H  


 


Monocytes %  7.0  


 


Eosinophils %  2.5  D  


 


Basophils %  1.1  


 


Nucleated RBC %  0  


 


PT with INR   11.40 


 


INR   1.01 


 


Sodium    135 L


 


Potassium    4.8


 


Chloride    101


 


Carbon Dioxide    27


 


Anion Gap    7 L


 


BUN    28 H


 


Creatinine    1.7 H


 


Creat Clearance w eGFR    29.30


 


Random Glucose    85


 


Calcium    10.2 H


 


Magnesium    2.2


 


Total Bilirubin    0.8  D


 


AST    26


 


ALT    22


 


Alkaline Phosphatase    69


 


Creatine Kinase    176


 


Creatine Kinase Index    1.6


 


CK-MB (CK-2)    2.820


 


Troponin I    < 0.02


 


Total Protein    7.6


 


Albumin    3.9


 


TSH    2.10














  05/26/18 05/26/18 05/26/18





  10:00 10:20 10:20


 


WBC    6.3


 


RBC    4.13


 


Hgb    12.7


 


Hct    37.9


 


MCV    91.9


 


MCH    30.7


 


MCHC    33.4


 


RDW    13.7


 


Plt Count    284


 


MPV    6.9 L


 


Neutrophils %    56.4  D


 


Lymphocytes %    37.6


 


Monocytes %    4.5


 


Eosinophils %    0.8


 


Basophils %    0.7


 


Nucleated RBC %    0


 


PT with INR   11.40 


 


INR   1.01 


 


Sodium  135 L  


 


Potassium  4.9  


 


Chloride  101  


 


Carbon Dioxide  27  


 


Anion Gap  7 L  


 


BUN  28 H  


 


Creatinine  1.6 H  


 


Creat Clearance w eGFR  31.42  


 


Random Glucose  81  


 


Calcium  10.3 H  


 


Magnesium  2.1  


 


Total Bilirubin  0.8  


 


AST  26  


 


ALT  21  


 


Alkaline Phosphatase  67  


 


Creatine Kinase  169  


 


Creatine Kinase Index  1.6  


 


CK-MB (CK-2)  2.708  


 


Troponin I  < 0.02  


 


Total Protein  7.4  


 


Albumin  4.0  


 


TSH  1.45  











ASSESSMENT/PLAN:


This is a 73 year old woman with a history of HTN, hyperlipdemia, tobacco use 

who presents to the ED with chest pain and palpitations.





1. Chest pain


   - Observe on telemetry


   - Serial troponins


   - Echocardiogram


   - Aspirin


   - Cardiology consult


2. Acute kidney injury


   - Hold Cozaar, HCTZ


   - IV fluid


   - Monitor BUN, creatinine


3. Stage 3 CKD


   - Baseline creatinine 1.1


4. HTN


   - Hold Cozaar, HCTZ secondary to LANDON


5. Hyperlipidemia


   - Continue Lipitor


6. Nicotine dependence


   - Smoking cessation advised


7. Hyponatremia, mild


   - Likely secondary to HCTZ


   - HCTZ being held secondary to LANDON


   - Monitor electrolytes





Visit type





- Emergency Visit


Emergency Visit: Yes


ED Registration Date: 05/26/18


Care time: The patient presented to the Emergency Department on the above date 

and was hospitalized for further evaluation of their emergent condition.





- New Patient


This patient is new to me today: Yes


Date on this admission: 05/26/18





- Critical Care


Critical Care patient: No





Hospitalist Screening





- Colonoscopy Questionnaire


Colonoscopy Questionnaire: 





Colonoscopy Questionnaire








-   Patient:


50 - 75 years old and never had a screening colonoscopy: No


History of colon or rectal polyps, or CA: No


History of IBD, Crohn's disease or UC: No


History of abdominal radiation therapy as a child: No





-   Relative:


1 with colon or rectal CA, or polyps at age 60 or younger: No


Colon or rectal CA diagnosed at age 45 or younger: No


Multiple relatives with colon or rectal CA: No





-   Outcome:


Screening Result: Negative Screen

## 2018-05-26 NOTE — PDOC
Attending Attestation





- Resident


Resident Name: Bam Viveros





- ED Attending Attestation


I have performed the following: I have examined & evaluated the patient, The 

case was reviewed & discussed with the resident, I agree w/resident's findings 

& plan, Exceptions are as noted





- Medical Decision Making





05/26/18 07:17








I, Dr. Lillian Youngblood, DO, attest that this document has been prepared under 

my direction and personally reviewed by me in its entirety.   I further attest, 

that it accurately reflects all work, treatment, procedures and medical decision

-making performed by me.  


05/26/18 08:27


a/p: 72yo female with palpitations and substernal cp assoc with sob this AM


-no radiation


-lasted 30 min


-hx of htn, hld, smoking


-no pain at this time


-took asa at home


-will check labs, ekg, cxr, tsh


-no recent travel


-no leg swelling, calf cramping, no pleuritic chest pain





<Lillian Youngblood - Last Filed: 05/26/18 08:27>





- HPI


HPI: 


05/26/18 09:38


The patient is a 73 year old female with a past medical history of DM, HTN, HLD

, tobacco abuse (0.25 packs per day) who presents to the Emergency Department 

today with chest pain and palpitations that woke her up from sleep this 

morning. She says the pain has now improved but has not resolved. She describes 

her pain as mid-sternal and non-radiating. She endorses exacerbated pain with 

inspiration and palpitations. She denies associated shortness of breath and 

diaphoresis. She denies fever, chills, nausea, and diarrhea. She denies leg 

swelling, recent travel, history of blood clots. She denies recent increases in 

physical activities. She states that she took 1 baby aspirin at home in 2 while 

in the ED. 





- Physicial Exam


PE: 


05/26/18 09:39


GENERAL: Awake, alert, and fully oriented, in no acute distress


HEAD: No signs of trauma


EYES: PERRLA, EOMI, sclera anicteric, conjunctiva clear


ENT: Auricles normal inspection, hearing grossly normal, nares patent, 

oropharynx clear without exudates. Moist mucosa


NECK: Normal ROM, supple, no lymphadenopathy, JVD, or masses


LUNGS: Breath sounds equal, clear to auscultation bilaterally.  No wheezes, and 

no crackles


HEART: (+) Reproducible mid-sternal chest tenderness on palpation. Regular rate 

and rhythm, normal S1 and S2, no murmurs, rubs or gallops


ABDOMEN: Soft, nontender, normoactive bowel sounds.  No guarding, no rebound.  

No masses


EXTREMITIES: Normal range of motion, no edema.  No clubbing or cyanosis. No 

cords, erythema, or tenderness


NEUROLOGICAL: Cranial nerves II through XII grossly intact.  Normal speech, 

normal gait


SKIN: Warm, Dry, normal turgor, no rashes or lesions noted.








- Medical Decision Making


05/26/18 09:39


Documentation prepared by Tho Khan, acting as medical scribe for Lillian Youngblood DO.





<Tho Khan - Last Filed: 05/26/18 09:39>





**Heart Score/ECG Review





- ECG Intrepretation


Comment:: 





05/26/18 08:36


sinus at 75, nl axis, nl interval, t wave inversions V2 and Avl which are 

unchanged from prior ekg in march 2018





<Lillian Youngblood - Last Filed: 05/26/18 08:27>

## 2018-05-26 NOTE — PDOC
History of Present Illness





- General


Chief Complaint: Palpitations


Stated Complaint: PALPITATIONS


Time Seen by Provider: 05/26/18 07:07


History Source: Patient


Exam Limitations: Language Barrier





- History of Present Illness


Initial Comments: 





05/26/18 07:39


Patient is an 73F with history of DM, HTN, HLD, tobacco abuse here today 

complaining of chest pain and palpitations that woke her up from sleep this 

morning. She says the pain has now improved but has not resolved. Denies 

associated shortness of breath. Patient endorses increased pain with palpation 

and inspiration. Patient denies leg swelling, recent travel, history of blood 

clots. Patient denies fevers, chills, nausea, vomiting and shortness of breath. 

Denies recent increases in physical activities.





 used to gather history.








Past History





- Past Medical History


Allergies/Adverse Reactions: 


 Allergies











Allergy/AdvReac Type Severity Reaction Status Date / Time


 


No Known Allergies Allergy   Verified 05/26/18 08:20











Home Medications: 


Ambulatory Orders





Atorvastatin Ca [Lipitor] 10 mg PO HS #30 tablet 03/11/17 


Losartan 50Mg/Hctz 12.5MG [Hyzaar -] 1 tab PO DAILY 03/15/18 








Cardiac Disorders: Yes


COPD: No


Diabetes: Yes


HTN: Yes


Hypercholesterolemia: Yes





- Suicide/Smoking/Psychosocial Hx


Smoking History: Never smoked


Have you smoked in the past 12 months: No


Number of Cigarettes Smoked Daily: 3


Information on smoking cessation initiated: No


'Breaking Loose' booklet given: 03/10/17


Hx Alcohol Use: No


Drug/Substance Use Hx: No


Substance Use Type: None


Hx Substance Use Treatment: No





**Review of Systems





- Review of Systems


Able to Perform ROS?: Yes


Comments:: 





05/26/18 07:41


GENERAL/CONSTITUTIONAL: No fever or chills. No weakness.


HEAD, EYES, EARS, NOSE AND THROAT: No change in vision. No sore throat.


CARDIOVASCULAR: Positive for chest pain. Negative for shortness of breath


RESPIRATORY: No cough, wheezing, or hemoptysis.


GASTROINTESTINAL: No nausea, vomiting, diarrhea or constipation.


GENITOURINARY: No dysuria, frequency, or change in urination.


MUSCULOSKELETAL: No joint or muscle swelling or pain. No neck or back pain.


SKIN: No rash


NEUROLOGIC: No headache, vertigo, loss of consciousness, or change in strength/

sensation.


HEMATOLOGIC/LYMPHATIC: No anemia, easy bleeding, or history of blood clots.


ALLERGIC/IMMUNOLOGIC: No hives or skin allergy.








*Physical Exam





- Vital Signs


 Last Vital Signs











Temp Pulse Resp BP Pulse Ox


 


 97.7 F   81   19   134/66   99 


 


 05/26/18 07:09  05/26/18 07:09  05/26/18 07:09  05/26/18 07:09  05/26/18 07:46














- Physical Exam


Comments: 





05/26/18 07:43


GENERAL: Awake, alert, and fully oriented, in no acute distress


HEAD: No signs of trauma, normocephalic, atraumatic


EYES: PERRLA, EOMI, sclera anicteric, conjunctiva clear


ENT: Auricles normal inspection, hearing grossly normal, nares patent, 

oropharynx clear without


exudates. Moist mucosa


NECK: Normal ROM, supple, no lymphadenopathy, JVD, or masses


LUNGS: No distress, speaks full sentences, clear to auscultation bilaterally


HEART: Regular rate and rhythm, normal S1 and S2, no murmurs, rubs or gallops, 

peripheral pulses normal and equal bilaterally.


ABDOMEN: Soft, nontender, normoactive bowel sounds.  No guarding, no rebound.  

No masses


EXTREMITIES: Normal inspection, Normal range of motion, no edema.  No clubbing 

or cyanosis.


NEUROLOGICAL: Cranial nerves II through XII grossly intact.  Normal speech, no 

focal sensorimotor deficits


SKIN: Warm, Dry, normal turgor, no rashes or lesions noted.








**Heart Score/ECG Review





- History


History: Slightly suspicious





- Electrocardiogram


EKG: Non specific repolarization disturbance





- Age


Age: >/= 65





- Risk Factors


Risk Factors Heart Score: Yes Hx Hypercholesterolemia, Yes Hx Hypertension, Yes 

Hx Diabetes


Based on the list above the patient has:: >/=3 risk factors or Hx 

atherosclerotic disease





- Troponin


Troponin: </= normal limit





- Score


Heart Score - Total: 5





ED Treatment Course





- LABORATORY


CBC & Chemistry Diagram: 


 05/26/18 07:30





 05/26/18 07:30





- ADDITIONAL ORDERS


Additional order review: 


 Laboratory  Results











  05/26/18 05/26/18





  07:30 07:30


 


PT with INR   11.40


 


INR   1.01


 


Sodium  135 L 


 


Potassium  4.8 


 


Chloride  101 


 


Carbon Dioxide  27 


 


Anion Gap  7 L 


 


BUN  28 H 


 


Creatinine  1.7 H 


 


Creat Clearance w eGFR  29.30 


 


Random Glucose  85 


 


Calcium  10.2 H 


 


Magnesium  2.2 


 


Total Bilirubin  0.8  D 


 


AST  26 


 


ALT  22 


 


Alkaline Phosphatase  69 


 


Creatine Kinase  176 


 


Troponin I  < 0.02 


 


Total Protein  7.6 


 


Albumin  3.9 








 











  05/26/18





  07:30


 


RBC  4.18


 


MCV  91.6


 


MCHC  33.8


 


RDW  13.4


 


MPV  6.5 L


 


Neutrophils %  43.6


 


Lymphocytes %  45.8 H


 


Monocytes %  7.0


 


Eosinophils %  2.5  D


 


Basophils %  1.1














- RADIOLOGY


Radiology Studies Ordered: 














 Category Date Time Status


 


 CHEST X-RAY PORTABLE* [RAD] Stat Radiology  05/26/18 07:26 Completed














- Medications


Given in the ED: 


ED Medications














Discontinued Medications














Generic Name Dose Route Start Last Admin





  Trade Name Freq  PRN Reason Stop Dose Admin


 


Aspirin  162 mg  05/26/18 07:25  05/26/18 07:35





  Asa -  PO  05/26/18 07:26  162 mg





  ONCE ONE   Administration





     





     





     





     














Medical Decision Making





- Medical Decision Making





05/26/18 07:43


Patient is 73F with history of HTN, HLD, DM, and tobacco abuse here today with 

chest pain. Vital signs stable, O2 sat of 9 is in error. DDx includes, but is 

not limited to: ACS, arrhythmia, costcochondritis. Do not suspect PE at this 

time given history and physical. Will do cardiac workup. Will give patient 

aspirin. While symptoms are atypical, patient does have multiple risk factors 

and a baseline HEART score of 4. Likely admit.


05/26/18 08:28


EKG shows normal sinus rhythm with rate of 75. EKG shows apparent ST elevations 

in V4/5 without any reciprocal depressions. Inverted t wave in aVL. Both of 

these found on prior EKG from 3/15/18. Normal QRS/QTc/NC intervals


05/26/18 09:02


 Laboratory Tests











  03/18/18 05/26/18 05/26/18





  06:57 07:30 07:30


 


WBC   5.9 


 


Hgb   13.0 


 


Plt Count   293 


 


Creatinine  1.1 H   1.7 H


 


Troponin I    < 0.02








CBC normal. Cr shows evidence of LANDON, given 500cc of fluids. Troponin 

undetectable. HEART score 5. Will obs tele.


05/26/18 09:04


CXR shows no acute cardiopulmonary process.


05/26/18 09:25


Obs tele via [facility] ER.





*DC/Admit/Observation/Transfer


Diagnosis at time of Disposition: 


 Chest pain








- Discharge Dispostion


Condition at time of disposition: Stable


Decision to Admit order: Yes


Decision to Admit order Date/Time: 


Decision to Admit Order











 Category Date Time Status


 


 Decision to Admit to Hospital Routine Admission  05/26/18 09:25 Active














- Referrals


Referrals: 


Kiel Van MD [Primary Care Provider] - 





- Patient Instructions





- Post Discharge Activity

## 2018-05-26 NOTE — CON.CARD
Consult


Consult Specialty:: Cardiology





- History of Present Illness


History of Present Illness: 





The patient is a 73 year old female with a past medical history of DM, HTN, HLD

, tobacco abuse (0.25 packs per day) who presents to the Emergency Department 

today with chest pain and palpitations that woke her up from sleep this 

morning. She says the pain has now improved but has not resolved. She describes 

her pain as mid-sternal and non-radiating. She endorses exacerbated pain with 

inspiration and palpitations. She denies associated shortness of breath and 

diaphoresis. She denies fever, chills, nausea, and diarrhea. She denies leg 

swelling, recent travel, history of blood clots. She denies recent increases in 

physical activities. She states that she took 1 baby aspirin at home in 2 while 

in the ED. 





- History Source


History Provided By: Patient, Medical Record





- Past Medical History


Cardio/Vascular: Yes: HTN, Hyperlipdemia





- Alcohol/Substance Use


Hx Alcohol Use: No





- Smoking History


Smoking history: Current some day smoker


Have you smoked in the past 12 months: Yes


Aproximately how many cigarettes per day: 3





Home Medications





- Allergies


Allergies/Adverse Reactions: 


 Allergies











Allergy/AdvReac Type Severity Reaction Status Date / Time


 


No Known Allergies Allergy   Verified 05/26/18 08:20














- Home Medications


Home Medications: 


Ambulatory Orders





Atorvastatin Ca [Lipitor] 10 mg PO HS #30 tablet 03/11/17 


Losartan 50Mg/Hctz 12.5MG [Hyzaar -] 1 tab PO DAILY 03/15/18 











Review of Systems





- Review of Systems


Constitutional: reports: No Symptoms


Eyes: reports: No Symptoms


HENT: reports: No Symptoms


Neck: reports: No Symptoms


Cardiovascular: reports: Chest Pain


Gastrointestinal: reports: No Symptoms


Genitourinary: reports: No Symptoms


Breasts: reports: No Symptoms Reported


Musculoskeletal: reports: No Symptoms


Integumentary: reports: No Symptoms


Neurological: reports: No Symptoms


Endocrine: reports: No Symptoms


Hematology/Lymphatic: reports: No Symptoms


Psychiatric: reports: No Symptoms


Vital Signs: 


 Vital Signs











Temperature  98.4 F   05/26/18 14:17


 


Pulse Rate  78   05/26/18 14:17


 


Respiratory Rate  18   05/26/18 16:53


 


Blood Pressure  134/66   05/26/18 14:17


 


O2 Sat by Pulse Oximetry (%)  95   05/26/18 16:53











Constitutional: Yes: Well Nourished, No Distress, Calm


Eyes: Yes: WNL, Conjunctiva Clear, EOM Intact


HENT: Yes: WNL, Atraumatic, Normocephalic


Neck: Yes: WNL, Supple, Trachea Midline


Respiratory: Yes: WNL, Regular, CTA Bilaterally


Gastrointestinal: Yes: WNL, Normal Bowel Sounds


Renal/: Yes: WNL


Cardiovascular: Yes: WNL, Regular Rate and Rhythm


Musculoskeletal: Yes: WNL


Extremities: Yes: WNL


Integumentary: Yes: WNL


Neurological: Yes: WNL, Alert, Oriented


...Motor Strength: WNL


Psychiatric: Yes: WNL, Alert, Oriented





- Other Data


Labs, Other Data: 


 CBC, BMP





 05/26/18 10:20 





 05/26/18 10:00 





 INR, PTT











INR  1.01  (0.82-1.09)   05/26/18  10:20    








 Troponin, BNP











  05/26/18 05/26/18





  07:30 10:00


 


Troponin I  < 0.02  < 0.02








 Troponin, BNP











  05/26/18 05/26/18





  07:30 10:00


 


Troponin I  < 0.02  < 0.02














Imaging





- Results


Chest X-ray: Image Reviewed (no i/e)


EKG: Image Reviewed (sr early repol)





Problem List





- Problems


(1) Chest pain


Code(s): R07.9 - CHEST PAIN, UNSPECIFIED   





(2) LANDON (acute kidney injury)


Code(s): N17.9 - ACUTE KIDNEY FAILURE, UNSPECIFIED   





(3) Anxiety and depression


Code(s): F41.9 - ANXIETY DISORDER, UNSPECIFIED; F32.9 - MAJOR DEPRESSIVE 

DISORDER, SINGLE EPISODE, UNSPECIFIED   





(4) Atypical chest pain


Code(s): R07.89 - OTHER CHEST PAIN   





(5) Back pain


Code(s): M54.9 - DORSALGIA, UNSPECIFIED   





(6) Cough


Code(s): R05 - COUGH   





(7) DM2 (diabetes mellitus, type 2)


Code(s): E11.9 - TYPE 2 DIABETES MELLITUS WITHOUT COMPLICATIONS   





(8) Gastritis


Code(s): K29.70 - GASTRITIS, UNSPECIFIED, WITHOUT BLEEDING   





(9) HTN (hypertension)


Code(s): I10 - ESSENTIAL (PRIMARY) HYPERTENSION   





(10) Hyperlipidemia


Code(s): E78.5 - HYPERLIPIDEMIA, UNSPECIFIED   





(11) Hyponatremia


Code(s): E87.1 - HYPO-OSMOLALITY AND HYPONATREMIA   





(12) Hypotension


Code(s): I95.9 - HYPOTENSION, UNSPECIFIED   


Qualifiers: 


   Hypotension type: other hypotension type   Qualified Code(s): I95.89 - Other 

hypotension   





(13) Hypoxia


Code(s): R09.02 - HYPOXEMIA   





(14) Palpitations


Code(s): R00.2 - PALPITATIONS   





(15) Precordial chest pain


Code(s): R07.2 - PRECORDIAL PAIN   





(16) Smoker


Code(s): F17.200 - NICOTINE DEPENDENCE, UNSPECIFIED, UNCOMPLICATED   





(17) UTI (urinary tract infection)


Code(s): N39.0 - URINARY TRACT INFECTION, SITE NOT SPECIFIED   





Assessment/Plan





cpsx


htn


hlp








plan





asa


bb


r/o mi


echo


mibi st

## 2018-05-27 RX ADMIN — ATORVASTATIN CALCIUM SCH: 10 TABLET, FILM COATED ORAL at 22:20

## 2018-05-27 RX ADMIN — SODIUM CHLORIDE SCH MLS/HR: 9 INJECTION, SOLUTION INTRAVENOUS at 10:16

## 2018-05-27 RX ADMIN — METOPROLOL TARTRATE SCH MG: 25 TABLET, FILM COATED ORAL at 10:15

## 2018-05-27 RX ADMIN — METOPROLOL TARTRATE SCH: 25 TABLET, FILM COATED ORAL at 22:20

## 2018-05-27 NOTE — PN
Progress Note, Physician


History of Present Illness: 





The patient is a 73 year old female with a past medical history of DM, HTN, HLD

, tobacco abuse (0.25 packs per day) who presents to the Emergency Department 

today with chest pain and palpitations that woke her up from sleep this 

morning. She says the pain has now improved but has not resolved. She describes 

her pain as mid-sternal and non-radiating. She endorses exacerbated pain with 

inspiration and palpitations. She denies associated shortness of breath and 

diaphoresis. She denies fever, chills, nausea, and diarrhea. She denies leg 

swelling, recent travel, history of blood clots. She denies recent increases in 

physical activities. She states that she took 1 baby aspirin at home in 2 while 

in the ED. 





- Current Medication List


Current Medications: 


Active Medications





Acetaminophen (Tylenol -)  650 mg PO Q4H PRN


   PRN Reason: PAIN


Atorvastatin Calcium (Lipitor -)  10 mg PO HS [facility]


   Last Admin: 05/26/18 22:28 Dose:  Not Given


Sodium Chloride (Normal Saline -)  1,000 mls @ 75 mls/hr IV ASDIR [facility]


   Last Admin: 05/26/18 14:04 Dose:  75 mls/hr


Ondansetron HCl (Zofran Injection)  4 mg IVPUSH Q6H PRN


   PRN Reason: NAUSEA











- Objective


Vital Signs: 


 Vital Signs











Temperature  98 F   05/27/18 06:00


 


Pulse Rate  75   05/27/18 06:00


 


Respiratory Rate  17   05/27/18 09:00


 


Blood Pressure  122/68   05/27/18 06:00


 


O2 Sat by Pulse Oximetry (%)  96   05/27/18 09:00











Eyes: Yes: WNL, Conjunctiva Clear, EOM Intact


HENT: Yes: WNL, Atraumatic, Normocephalic


Neck: Yes: WNL, Supple, Trachea Midline


Cardiovascular: Yes: WNL, Regular Rate and Rhythm


Respiratory: Yes: WNL, Regular, CTA Bilaterally


Gastrointestinal: Yes: WNL, Normal Bowel Sounds


Genitourinary: Yes: WNL


Musculoskeletal: Yes: WNL


Extremities: Yes: WNL


Edema: No


Integumentary: Yes: WNL


Neurological: Yes: WNL, Alert, Oriented


...Motor Strength: WNL


Psychiatric: Yes: WNL


Labs: 


 CBC, BMP





 05/26/18 10:20 





 05/26/18 10:00 





 INR, PTT











INR  1.01  (0.82-1.09)   05/26/18  10:20    








 Laboratory Tests











  05/26/18 05/26/18 05/26/18





  07:30 07:30 07:30


 


WBC  5.9  


 


RBC  4.18  


 


Hgb  13.0  


 


Hct  38.3  


 


MCV  91.6  


 


MCH  31.0  


 


MCHC  33.8  


 


RDW  13.4  


 


Plt Count  293  


 


MPV  6.5 L  


 


Neutrophils %  43.6  


 


Lymphocytes %  45.8 H  


 


Monocytes %  7.0  


 


Eosinophils %  2.5  D  


 


Basophils %  1.1  


 


Nucleated RBC %  0  


 


PT with INR   11.40 


 


INR   1.01 


 


Sodium    135 L


 


Potassium    4.8


 


Chloride    101


 


Carbon Dioxide    27


 


Anion Gap    7 L


 


BUN    28 H


 


Creatinine    1.7 H


 


Creat Clearance w eGFR    29.30


 


Random Glucose    85


 


Calcium    10.2 H


 


Magnesium    2.2


 


Total Bilirubin    0.8  D


 


AST    26


 


ALT    22


 


Alkaline Phosphatase    69


 


Creatine Kinase    176


 


Creatine Kinase Index    1.6


 


CK-MB (CK-2)    2.820


 


Troponin I    < 0.02


 


Total Protein    7.6


 


Albumin    3.9


 


TSH    2.10














  05/26/18 05/26/18 05/26/18





  10:00 10:20 10:20


 


WBC    6.3


 


RBC    4.13


 


Hgb    12.7


 


Hct    37.9


 


MCV    91.9


 


MCH    30.7


 


MCHC    33.4


 


RDW    13.7


 


Plt Count    284


 


MPV    6.9 L


 


Neutrophils %    56.4  D


 


Lymphocytes %    37.6


 


Monocytes %    4.5


 


Eosinophils %    0.8


 


Basophils %    0.7


 


Nucleated RBC %    0


 


PT with INR   11.40 


 


INR   1.01 


 


Sodium  135 L  


 


Potassium  4.9  


 


Chloride  101  


 


Carbon Dioxide  27  


 


Anion Gap  7 L  


 


BUN  28 H  


 


Creatinine  1.6 H  


 


Creat Clearance w eGFR  31.42  


 


Random Glucose  81  


 


Calcium  10.3 H  


 


Magnesium  2.1  


 


Total Bilirubin  0.8  


 


AST  26  


 


ALT  21  


 


Alkaline Phosphatase  67  


 


Creatine Kinase  169  


 


Creatine Kinase Index  1.6  


 


CK-MB (CK-2)  2.708  


 


Troponin I  < 0.02  


 


Total Protein  7.4  


 


Albumin  4.0  


 


TSH  1.45  














  05/26/18 05/27/18





  21:30 03:15


 


WBC  


 


RBC  


 


Hgb  


 


Hct  


 


MCV  


 


MCH  


 


MCHC  


 


RDW  


 


Plt Count  


 


MPV  


 


Neutrophils %  


 


Lymphocytes %  


 


Monocytes %  


 


Eosinophils %  


 


Basophils %  


 


Nucleated RBC %  


 


PT with INR  


 


INR  


 


Sodium  


 


Potassium  


 


Chloride  


 


Carbon Dioxide  


 


Anion Gap  


 


BUN  


 


Creatinine  


 


Creat Clearance w eGFR  


 


Random Glucose  


 


Calcium  


 


Magnesium  


 


Total Bilirubin  


 


AST  


 


ALT  


 


Alkaline Phosphatase  


 


Creatine Kinase  


 


Creatine Kinase Index  


 


CK-MB (CK-2)  


 


Troponin I  < 0.02  < 0.02


 


Total Protein  


 


Albumin  


 


TSH  














Problem List





- Problems


(1) Chest pain


Code(s): R07.9 - CHEST PAIN, UNSPECIFIED   





(2) LANDON (acute kidney injury)


Code(s): N17.9 - ACUTE KIDNEY FAILURE, UNSPECIFIED   





(3) Anxiety and depression


Code(s): F41.9 - ANXIETY DISORDER, UNSPECIFIED; F32.9 - MAJOR DEPRESSIVE 

DISORDER, SINGLE EPISODE, UNSPECIFIED   





(4) Atypical chest pain


Code(s): R07.89 - OTHER CHEST PAIN   





(5) Back pain


Code(s): M54.9 - DORSALGIA, UNSPECIFIED   





(6) Cough


Code(s): R05 - COUGH   





(7) DM2 (diabetes mellitus, type 2)


Code(s): E11.9 - TYPE 2 DIABETES MELLITUS WITHOUT COMPLICATIONS   





(8) Gastritis


Code(s): K29.70 - GASTRITIS, UNSPECIFIED, WITHOUT BLEEDING   





(9) HTN (hypertension)


Code(s): I10 - ESSENTIAL (PRIMARY) HYPERTENSION   





(10) Hyperlipidemia


Code(s): E78.5 - HYPERLIPIDEMIA, UNSPECIFIED   





(11) Hyponatremia


Code(s): E87.1 - HYPO-OSMOLALITY AND HYPONATREMIA   





(12) Hypotension


Code(s): I95.9 - HYPOTENSION, UNSPECIFIED   


Qualifiers: 


   Hypotension type: other hypotension type   Qualified Code(s): I95.89 - Other 

hypotension   





(13) Hypoxia


Code(s): R09.02 - HYPOXEMIA   





(14) Palpitations


Code(s): R00.2 - PALPITATIONS   





(15) Precordial chest pain


Code(s): R07.2 - PRECORDIAL PAIN   





(16) Smoker


Code(s): F17.200 - NICOTINE DEPENDENCE, UNSPECIFIED, UNCOMPLICATED   





(17) UTI (urinary tract infection)


Code(s): N39.0 - URINARY TRACT INFECTION, SITE NOT SPECIFIED   





Assessment/Plan





cpsx


htn


hlp


renal insufficiency








plan





asa


bb


r/o mi


echo


mibi st

## 2018-05-27 NOTE — PN
Physical Exam: 


SUBJECTIVE: Patient seen and examined. No further chest pain or palpitations.








OBJECTIVE:





 Vital Signs











 Period  Temp  Pulse  Resp  BP Sys/Mñuoz  Pulse Ox


 


 Last 24 Hr  96.7 F-98.6 F  64-84  17-18  117-150/61-75  95-96











GENERAL: The patient is awake, alert, and fully oriented, in no acute distress.


LUNGS: Breath sounds equal, clear to auscultation bilaterally, no wheezes, no 

crackles, no accessory muscle use. 


HEART: Regular rate and rhythm, S1, S2 without murmur, rub or gallop.


ABDOMEN: Soft, nontender, nondistended, normoactive bowel sounds, no guarding, 

no rebound, no hepatosplenomegaly, no masses.


EXTREMITIES: 2+ pulses, warm, well-perfused, no edema. 








 Laboratory Results - last 24 hr











  05/26/18 05/27/18





  21:30 03:15


 


Troponin I  < 0.02  < 0.02








Active Medications











Generic Name Dose Route Start Last Admin





  Trade Name Freq  PRN Reason Stop Dose Admin


 


Acetaminophen  650 mg  05/26/18 09:31  





  Tylenol -  PO   





  Q4H PRN   





  PAIN   





     





     





     


 


Atorvastatin Calcium  10 mg  05/26/18 22:00  05/26/18 22:28





  Lipitor -  PO   Not Given





  HS POLI   





     





     





     





     


 


Sodium Chloride  1,000 mls @ 75 mls/hr  05/26/18 09:45  05/27/18 10:16





  Normal Saline -  IV   75 mls/hr





  ASDIR POLI   Administration





     





     





     





     


 


Metoprolol Tartrate  25 mg  05/27/18 10:00  05/27/18 10:15





  Lopressor -  PO   25 mg





  BID POLI   Administration





     





     





     





     


 


Ondansetron HCl  4 mg  05/26/18 09:31  





  Zofran Injection  IVPUSH   





  Q6H PRN   





  NAUSEA   





     





     





     











ASSESSMENT/PLAN:


This is a 73 year old woman with a history of HTN, hyperlipdemia, tobacco use 

who presented to the ED with chest pain and palpitations.





1. Chest pain


   - Troponins negative


   - Echocardiogram


   - Continue aspirin, Lopressor


   - Cardiology consult appreciated - plan for nuclear stress


2. Acute kidney injury


   - Cozaar, HCTZ held


   - Continue IV fluid


   - Monitor BUN, creatinine


3. Stage 3 CKD


   - Baseline creatinine 1.1


4. HTN


   - Cozaar, HCTZ held secondary to LANDON


   - Lopressor started


5. Hyperlipidemia


   - Continue Lipitor


6. Nicotine dependence


   - Smoking cessation advised


7. Hyponatremia, mild


   - Likely secondary to HCTZ


   - HCTZ held secondary to LANDON


   - Monitor electrolytes


8. Hypercalcemia, mild


   - Likely secondary to HCTZ


   - HCTZ held secondary to LANDON


   - IV fluid


   - Monitor serum calcium





Visit type





- Emergency Visit


Emergency Visit: Yes


ED Registration Date: 05/26/18


Care time: The patient presented to the Emergency Department on the above date 

and was hospitalized for further evaluation of their emergent condition.





- New Patient


This patient is new to me today: No





- Critical Care


Critical Care patient: No





- Discharge Referral


Referred to [facility] No

## 2018-05-28 LAB
ANION GAP SERPL CALC-SCNC: 5 MMOL/L (ref 8–16)
BUN SERPL-MCNC: 22 MG/DL (ref 7–18)
CALCIUM SERPL-MCNC: 9.6 MG/DL (ref 8.5–10.1)
CHLORIDE SERPL-SCNC: 107 MMOL/L (ref 98–107)
CO2 SERPL-SCNC: 28 MMOL/L (ref 21–32)
CREAT SERPL-MCNC: 1.2 MG/DL (ref 0.55–1.02)
GLUCOSE SERPL-MCNC: 86 MG/DL (ref 74–106)
POTASSIUM SERPLBLD-SCNC: 4.6 MMOL/L (ref 3.5–5.1)
SODIUM SERPL-SCNC: 140 MMOL/L (ref 136–145)

## 2018-05-28 RX ADMIN — SODIUM CHLORIDE SCH MLS/HR: 9 INJECTION, SOLUTION INTRAVENOUS at 11:31

## 2018-05-28 RX ADMIN — TIOTROPIUM BROMIDE SCH PUFF: 18 CAPSULE ORAL; RESPIRATORY (INHALATION) at 13:32

## 2018-05-28 RX ADMIN — ASPIRIN SCH MG: 81 TABLET, COATED ORAL at 11:31

## 2018-05-28 RX ADMIN — METOPROLOL TARTRATE SCH MG: 25 TABLET, FILM COATED ORAL at 10:15

## 2018-05-28 RX ADMIN — ATORVASTATIN CALCIUM SCH MG: 10 TABLET, FILM COATED ORAL at 22:01

## 2018-05-28 RX ADMIN — METOPROLOL TARTRATE SCH MG: 25 TABLET, FILM COATED ORAL at 22:01

## 2018-05-28 NOTE — PN
Progress Note, Physician


Chief Complaint: 





AWAKE ALERT


CHART REVIEWED


STRESS TEST TOMORROW





- Current Medication List


Current Medications: 


Active Medications





Acetaminophen (Tylenol -)  650 mg PO Q4H PRN


   PRN Reason: PAIN


Atorvastatin Calcium (Lipitor -)  10 mg PO HS [facility]


   Last Admin: 05/27/18 22:20 Dose:  Not Given


Sodium Chloride (Normal Saline -)  1,000 mls @ 75 mls/hr IV ASDIR [facility]


   Last Admin: 05/27/18 10:16 Dose:  75 mls/hr


Metoprolol Tartrate (Lopressor -)  25 mg PO BID [facility]


   Last Admin: 05/27/18 22:20 Dose:  Not Given


Ondansetron HCl (Zofran Injection)  4 mg IVPUSH Q6H PRN


   PRN Reason: NAUSEA











- Objective


Vital Signs: 


 Vital Signs











Temperature  98.5 F   05/28/18 06:00


 


Pulse Rate  71   05/28/18 06:00


 


Respiratory Rate  20   05/28/18 06:00


 


Blood Pressure  112/70   05/28/18 06:00


 


O2 Sat by Pulse Oximetry (%)  97   05/27/18 21:00











Constitutional: Yes: Mild Distress


Eyes: Yes: WNL


HENT: Yes: WNL


Neck: Yes: WNL


Cardiovascular: Yes: WNL


Respiratory: Yes: WNL


Gastrointestinal: Yes: WNL


Genitourinary: Yes: WNL


Musculoskeletal: Yes: WNL


Extremities: Yes: WNL


Edema: No


Peripheral Pulses WNL: Yes


Integumentary: Yes: WNL


Wound/Incision: Yes: Clean/Dry


Neurological: Yes: WNL


...Motor Strength: WNL


Psychiatric: Yes: WNL


Labs: 


 CBC, BMP





 05/26/18 10:20 





 05/28/18 06:00 





 INR, PTT











INR  1.01  (0.82-1.09)   05/26/18  10:20    














Problem List





- Problems


(1) Chest pain


Code(s): R07.9 - CHEST PAIN, UNSPECIFIED   





(2) DM2 (diabetes mellitus, type 2)


Code(s): E11.9 - TYPE 2 DIABETES MELLITUS WITHOUT COMPLICATIONS   





(3) HTN (hypertension)


Code(s): I10 - ESSENTIAL (PRIMARY) HYPERTENSION   





(4) Hyperlipidemia


Code(s): E78.5 - HYPERLIPIDEMIA, UNSPECIFIED   





(5) Smoker


Code(s): F17.200 - NICOTINE DEPENDENCE, UNSPECIFIED, UNCOMPLICATED   





Assessment/Plan





CHART REVIEWED


AWAITING STRESS TEST TOMORROW


LIPID CONTROL


SMIKING CESSATION


CARDIOLOGY FOLLOW UP

## 2018-05-28 NOTE — PN
Progress Note, Physician


History of Present Illness: 





The patient is a 73 year old female with a past medical history of DM, HTN, HLD

, tobacco abuse (0.25 packs per day) who presents to the Emergency Department 

today with chest pain and palpitations that woke her up from sleep this 

morning. She says the pain has now improved but has not resolved. She describes 

her pain as mid-sternal and non-radiating. She endorses exacerbated pain with 

inspiration and palpitations. She denies associated shortness of breath and 

diaphoresis. She denies fever, chills, nausea, and diarrhea. She denies leg 

swelling, recent travel, history of blood clots. She denies recent increases in 

physical activities. She states that she took 1 baby aspirin at home in 2 while 

in the ED. 





- Current Medication List


Current Medications: 


Active Medications





Acetaminophen (Tylenol -)  650 mg PO Q4H PRN


   PRN Reason: PAIN


Atorvastatin Calcium (Lipitor -)  10 mg PO HS [facility]


   Last Admin: 05/27/18 22:20 Dose:  Not Given


Sodium Chloride (Normal Saline -)  1,000 mls @ 75 mls/hr IV ASDIR [facility]


   Last Admin: 05/27/18 10:16 Dose:  75 mls/hr


Metoprolol Tartrate (Lopressor -)  25 mg PO BID [facility]


   Last Admin: 05/27/18 22:20 Dose:  Not Given


Ondansetron HCl (Zofran Injection)  4 mg IVPUSH Q6H PRN


   PRN Reason: NAUSEA











- Objective


Vital Signs: 


 Vital Signs











Temperature  98.5 F   05/28/18 06:00


 


Pulse Rate  71   05/28/18 06:00


 


Respiratory Rate  20   05/28/18 06:00


 


Blood Pressure  112/70   05/28/18 06:00


 


O2 Sat by Pulse Oximetry (%)  97   05/27/18 21:00











Eyes: Yes: WNL, Conjunctiva Clear, EOM Intact


HENT: Yes: WNL, Atraumatic, Normocephalic


Neck: Yes: WNL, Supple, Trachea Midline


Cardiovascular: Yes: WNL, Regular Rate and Rhythm


Respiratory: Yes: WNL, Regular, CTA Bilaterally


Gastrointestinal: Yes: WNL, Normal Bowel Sounds


Genitourinary: Yes: WNL


Musculoskeletal: Yes: WNL


Extremities: Yes: WNL


Edema: No


Integumentary: Yes: WNL


Neurological: Yes: WNL, Alert, Oriented


...Motor Strength: WNL


Psychiatric: Yes: WNL


Labs: 


 CBC, BMP





 05/26/18 10:20 





 05/28/18 06:00 





 INR, PTT











INR  1.01  (0.82-1.09)   05/26/18  10:20    














Problem List





- Problems


(1) Chest pain


Code(s): R07.9 - CHEST PAIN, UNSPECIFIED   





(2) LANDON (acute kidney injury)


Code(s): N17.9 - ACUTE KIDNEY FAILURE, UNSPECIFIED   





(3) Anxiety and depression


Code(s): F41.9 - ANXIETY DISORDER, UNSPECIFIED; F32.9 - MAJOR DEPRESSIVE 

DISORDER, SINGLE EPISODE, UNSPECIFIED   





(4) Atypical chest pain


Code(s): R07.89 - OTHER CHEST PAIN   





(5) Back pain


Code(s): M54.9 - DORSALGIA, UNSPECIFIED   





(6) Cough


Code(s): R05 - COUGH   





(7) DM2 (diabetes mellitus, type 2)


Code(s): E11.9 - TYPE 2 DIABETES MELLITUS WITHOUT COMPLICATIONS   





(8) Gastritis


Code(s): K29.70 - GASTRITIS, UNSPECIFIED, WITHOUT BLEEDING   





(9) HTN (hypertension)


Code(s): I10 - ESSENTIAL (PRIMARY) HYPERTENSION   





(10) Hyperlipidemia


Code(s): E78.5 - HYPERLIPIDEMIA, UNSPECIFIED   





(11) Hyponatremia


Code(s): E87.1 - HYPO-OSMOLALITY AND HYPONATREMIA   





(12) Hypotension


Code(s): I95.9 - HYPOTENSION, UNSPECIFIED   


Qualifiers: 


   Hypotension type: other hypotension type   Qualified Code(s): I95.89 - Other 

hypotension   





(13) Hypoxia


Code(s): R09.02 - HYPOXEMIA   





(14) Palpitations


Code(s): R00.2 - PALPITATIONS   





(15) Precordial chest pain


Code(s): R07.2 - PRECORDIAL PAIN   





(16) Smoker


Code(s): F17.200 - NICOTINE DEPENDENCE, UNSPECIFIED, UNCOMPLICATED   





(17) UTI (urinary tract infection)


Code(s): N39.0 - URINARY TRACT INFECTION, SITE NOT SPECIFIED   





Assessment/Plan





cpsx


htn


hlp


renal insufficiency








plan





asa


bb


r/o mi


echo


mibi st

## 2018-05-29 VITALS — TEMPERATURE: 97.6 F | SYSTOLIC BLOOD PRESSURE: 120 MMHG | DIASTOLIC BLOOD PRESSURE: 54 MMHG | HEART RATE: 70 BPM

## 2018-05-29 LAB
CHOLEST SERPL-MCNC: 162 MG/DL (ref 50–200)
HDLC SERPL-MCNC: 56 MG/DL (ref 40–60)
TRIGL SERPL-MCNC: 101 MG/DL (ref 35–160)

## 2018-05-29 RX ADMIN — SODIUM CHLORIDE SCH: 9 INJECTION, SOLUTION INTRAVENOUS at 13:38

## 2018-05-29 RX ADMIN — TIOTROPIUM BROMIDE SCH PUFF: 18 CAPSULE ORAL; RESPIRATORY (INHALATION) at 13:38

## 2018-05-29 RX ADMIN — ASPIRIN SCH MG: 81 TABLET, COATED ORAL at 13:38

## 2018-05-29 RX ADMIN — METOPROLOL TARTRATE SCH MG: 25 TABLET, FILM COATED ORAL at 13:38

## 2018-05-29 NOTE — DS
Physical Examination


Vital Signs: 


 Vital Signs











Temperature  97.6 F   05/29/18 14:17


 


Pulse Rate  70   05/29/18 14:17


 


Respiratory Rate  14   05/29/18 14:17


 


Blood Pressure  120/54   05/29/18 14:17


 


O2 Sat by Pulse Oximetry (%)  96   05/29/18 09:00











Constitutional: Yes: No Distress


Eyes: Yes: WNL


HENT: Yes: WNL


Neck: Yes: WNL


Cardiovascular: Yes: WNL


Respiratory: Yes: WNL


Gastrointestinal: Yes: WNL


Musculoskeletal: Yes: WNL


Extremities: Yes: WNL


Edema: No


Peripheral Pulses WNL: Yes


Integumentary: Yes: WNL


Wound/Incision: Yes: Clean/Dry


Neurological: Yes: WNL


...Motor Strength: WNL


Psychiatric: Yes: WNL


Labs: 


 CBC, BMP





 05/26/18 10:20 





 05/28/18 06:00 











Discharge Summary


Reason For Visit: CHEST PAIN


Current Active Problems





Chest pain (Acute)


Lung nodule, multiple (Acute)


COPD








Procedures: Principal: STRESS TEST/CT SCAN CHEST


Hospital Course: 





ADMITTED FOR R/O CORONARY SYNDROME, STRESS TEST REVIEWED BY CARDIOLOGY AWAITING 

RESULTS


CAN DISCHARGE HOME IF CLEARED BY CARDIOLOGY


SPIRIVA STARTED FOR COPD


SMOKING CESSATION D/W PATIENT AND PMD


Condition: Stable





- Instructions


Diet, Activity, Other Instructions: 


SMOKING CESSATION


CAN START NICOTINE PATCHES FOR NOW


WILL D/W DR SANCHEZ HER PMD FOR F/U.


LOW SALT DIET


SEE CARDIOLOGY DR ALMANZA


Referrals: 


Kiel Sanchez MD [Primary Care Provider] - 


Disposition: HOME





- Home Medications


Comprehensive Discharge Medication List: 


Ambulatory Orders





Atorvastatin Ca [Lipitor] 10 mg PO HS #30 tablet 03/11/17 


Acetaminophen [Tylenol .Regular Strength -] 650 mg PO Q4H PRN  tablet 05/29/18 


Aspirin Coated [Ecotrin -] 81 mg PO DAILY #30 tablet.ec 05/29/18 


Metoprolol Tartrate [Lopressor -] 25 mg PO BID #60 tablet 05/29/18 


Tiotropium Bromide [Spiriva Respimat] 4 gm IH DAILY #1 mist.inhal 05/29/18 


Tiotropium Bromide [Spiriva] 1 puff IH DAILY  cap 05/29/18

## 2018-05-29 NOTE — PN
Progress Note, Physician


Chief Complaint: 





Pt A&Ox3; asymptomatic, but worried about "lung inflammation".


History of Present Illness: 





Patient is a 73F with history of DM, HTN, HLD, tobacco abuse here today 

complaining of chest pain and palpitations that woke her up from sleep this 

morning. She says the pain has now improved but has not resolved. Denies 

associated shortness of breath. Patient endorses increased pain with palpation 

and inspiration. Patient denies leg swelling, recent travel, history of blood 

clots. Patient denies fevers, chills, nausea, vomiting and shortness of breath. 

Denies recent increases in physical activities.





 used to gather history.











- Current Medication List


Current Medications: 


Active Medications





Acetaminophen (Tylenol -)  650 mg PO Q4H PRN


   PRN Reason: PAIN


Aspirin (Ecotrin -)  81 mg PO DAILY [facility]


   Last Admin: 05/29/18 13:38 Dose:  81 mg


Atorvastatin Calcium (Lipitor -)  10 mg PO HS [facility]


   Last Admin: 05/28/18 22:01 Dose:  10 mg


Sodium Chloride (Normal Saline -)  1,000 mls @ 75 mls/hr IV ASDIR [facility]


   Last Admin: 05/29/18 13:38 Dose:  Not Given


Metoprolol Tartrate (Lopressor -)  25 mg PO BID [facility]


   Last Admin: 05/29/18 13:38 Dose:  25 mg


Ondansetron HCl (Zofran Injection)  4 mg IVPUSH Q6H PRN


   PRN Reason: NAUSEA


Tiotropium Bromide (Spiriva -)  1 puff IH DAILY [facility]


   Last Admin: 05/29/18 13:38 Dose:  1 puff











- Objective


Vital Signs: 


 Vital Signs











Temperature  97.6 F   05/29/18 14:17


 


Pulse Rate  70   05/29/18 14:17


 


Respiratory Rate  14   05/29/18 14:17


 


Blood Pressure  120/54   05/29/18 14:17


 


O2 Sat by Pulse Oximetry (%)  96   05/29/18 09:00











Constitutional: Yes: Calm


Labs: 


 CBC, BMP





 05/26/18 10:20 





 05/28/18 06:00 





 INR, PTT











INR  1.01  (0.82-1.09)   05/26/18  10:20    














Problem List





- Problems


(1) Lung nodule, multiple


Code(s): R91.8 - OTHER NONSPECIFIC ABNORMAL FINDING OF LUNG FIELD   





(2) Anxiety and depression


Code(s): F41.9 - ANXIETY DISORDER, UNSPECIFIED; F32.9 - MAJOR DEPRESSIVE 

DISORDER, SINGLE EPISODE, UNSPECIFIED   





(3) Atypical chest pain


Assessment/Plan: 


Stress MIBI today: no myocardial ischemia.


From cardiac perspective, pt may be discharged home and followed as an 

outpatient.


Code(s): R07.89 - OTHER CHEST PAIN   





(4) DM2 (diabetes mellitus, type 2)


Assessment/Plan: 


Consider ACEI or ARB with improved renal function (for DM, HTN).


Code(s): E11.9 - TYPE 2 DIABETES MELLITUS WITHOUT COMPLICATIONS   





(5) HTN (hypertension)


Code(s): I10 - ESSENTIAL (PRIMARY) HYPERTENSION   





(6) Hyperlipidemia


Code(s): E78.5 - HYPERLIPIDEMIA, UNSPECIFIED

## 2018-05-29 NOTE — CON.PULM
Consult


Consult Specialty:: PULM/CCM 


Referred by:: GLENNY


Reason for Consultation:: Abnormal CT





- History of Present Illness


Chief Complaint: CP


History of Present Illness: 


73 F, significant smoking history (now 1/4 pack per day), DM, HTN, and HLD.  





Admitted via the ER due to chest pain and palpitations that woke her up from 

sleep this morning.





No fever or chills.  No hemoptysis. 





No overt history consistent with sleep apnea.  





No travel history or sick contacts. 





CT: diffuse emphysema / non-specific bilateral pulmonary nodules, the dominant 

lesions: 1.2 x 1.1 cm in the Left apex and a 1.2 x 0.9 cm LLL 





Patient reports that her PMD is Dr Van and that she did have previous CT 

imaging several years ago. 











- History Source


History Provided By: Patient


Limitations to Obtaining History: Language Barrier





- Past Medical History


Cardio/Vascular: Yes: HTN, Hyperlipdemia


Pulmonary: Yes: Other (active prolonged sm,o)





- Alcohol/Substance Use


Hx Alcohol Use: No





- Smoking History


Smoking history: Current some day smoker


Have you smoked in the past 12 months: Yes


Aproximately how many cigarettes per day: 3





Home Medications





- Allergies


Allergies/Adverse Reactions: 


 Allergies











Allergy/AdvReac Type Severity Reaction Status Date / Time


 


No Known Allergies Allergy   Verified 05/26/18 08:20














- Home Medications


Home Medications: 


Ambulatory Orders





Atorvastatin Ca [Lipitor] 10 mg PO HS #30 tablet 03/11/17 


Losartan 50Mg/Hctz 12.5MG [Hyzaar -] 1 tab PO DAILY 03/15/18 











Review of Systems





- Review of Systems


Constitutional: denies: Chills, Fever, Malaise, Night Sweats, Unintentional 

Wgt. Loss


Eyes: reports: No Symptoms


HENT: reports: No Symptoms


Neck: reports: No Symptoms


Cardiovascular: reports: Chest Pain.  denies: Edema, Palpitations, Shortness of 

Breath


Respiratory: reports: Cough.  denies: Hemoptysis, Orthopnea, Snoring, SOB, SOB 

on Exertion, Wheezing


Gastrointestinal: reports: No Symptoms


Genitourinary: reports: No Symptoms


Breasts: reports: No Symptoms Reported


Musculoskeletal: reports: No Symptoms


Integumentary: reports: No Symptoms


Neurological: reports: No Symptoms


Endocrine: reports: No Symptoms


Hematology/Lymphatic: reports: No Symptoms


Psychiatric: reports: No Symptoms





Physical Exam


Vital Sings: 


 Vital Signs











Temperature  98.0 F   05/29/18 09:00


 


Pulse Rate  56 L  05/29/18 09:00


 


Respiratory Rate  18   05/29/18 09:00


 


Blood Pressure  140/73   05/29/18 09:00


 


O2 Sat by Pulse Oximetry (%)  96   05/29/18 09:00











Constitutional: Yes: No Distress, Calm


Eyes: Yes: WNL, Conjunctiva Clear, EOM Intact


HENT: Yes: Atraumatic, Normocephalic


Neck: Yes: Supple, Trachea Midline


Cardiovascular: Yes: Regular Rate and Rhythm


Respiratory: Yes: CTA Bilaterally.  No: Accessory Muscle Use, Rales, Rhonchi, 

Stridor, Tachypnea, Wheezes


...Inspection: Yes: WNL


...Clubbing: No


Gastrointestinal: Yes: Normal Bowel Sounds, Soft


Renal/: Yes: WNL


Musculoskeletal: Yes: WNL


Extremities: Yes: WNL


Edema: No


Peripheral Pulses WNL: Yes


Integumentary: Yes: WNL


Neurological: Yes: WNL, Alert, Oriented


...Motor Strength: WNL


Psychiatric: Yes: WNL, Alert, Oriented


Labs: 


 CBC, BMP





 05/26/18 10:20 





 05/28/18 06:00 











Imaging





- Results


Chest X-ray: Report Reviewed, Image Reviewed


Cat Scan: Report Reviewed, Image Reviewed





Problem List





- Problems


(1) Lung nodule, multiple


Code(s): R91.8 - OTHER NONSPECIFIC ABNORMAL FINDING OF LUNG FIELD   





(2) Chest pain


Code(s): R07.9 - CHEST PAIN, UNSPECIFIED   





(3) Anxiety and depression


Code(s): F41.9 - ANXIETY DISORDER, UNSPECIFIED; F32.9 - MAJOR DEPRESSIVE 

DISORDER, SINGLE EPISODE, UNSPECIFIED   





(4) DM2 (diabetes mellitus, type 2)


Code(s): E11.9 - TYPE 2 DIABETES MELLITUS WITHOUT COMPLICATIONS   





(5) HTN (hypertension)


Code(s): I10 - ESSENTIAL (PRIMARY) HYPERTENSION   





(6) Hyperlipidemia


Code(s): E78.5 - HYPERLIPIDEMIA, UNSPECIFIED   





(7) Smoker


Code(s): F17.200 - NICOTINE DEPENDENCE, UNSPECIFIED, UNCOMPLICATED   





Assessment/Plan


The patient reports that her PMD Dr Van has performed CT imaging several 

years ago.  I gave her a copy of the CT report and my information.  If the 

findings on this current CT can be confirmed on her old CT, then further workup 

may not be needed.  





If the old CT reveals that the nodules are new or have enlarged, then PET/CT 

scan will be needed and a possible biopsy based on the results. 





No smoking was discussed in detail. 





PFTs as an outpatient. 





At this time, there is Pulmonary contraindication for D/C planning is her 

cardiac workup is negative. 





Will follow if she remains admitted.  





Thank you. 





Dr Wall

## 2021-10-01 ENCOUNTER — HOSPITAL ENCOUNTER (INPATIENT)
Dept: HOSPITAL 74 - JER | Age: 76
LOS: 2 days | Discharge: HOME | DRG: 192 | End: 2021-10-03
Attending: INTERNAL MEDICINE | Admitting: INTERNAL MEDICINE
Payer: COMMERCIAL

## 2021-10-01 VITALS — BODY MASS INDEX: 22.4 KG/M2

## 2021-10-01 DIAGNOSIS — E87.5: ICD-10-CM

## 2021-10-01 DIAGNOSIS — R07.89: ICD-10-CM

## 2021-10-01 DIAGNOSIS — F41.8: ICD-10-CM

## 2021-10-01 DIAGNOSIS — J44.1: Primary | ICD-10-CM

## 2021-10-01 DIAGNOSIS — E78.5: ICD-10-CM

## 2021-10-01 DIAGNOSIS — N18.9: ICD-10-CM

## 2021-10-01 DIAGNOSIS — I12.9: ICD-10-CM

## 2021-10-01 DIAGNOSIS — F17.210: ICD-10-CM

## 2021-10-01 LAB
ALBUMIN SERPL-MCNC: 4.3 G/DL (ref 3.4–5)
ALP SERPL-CCNC: 104 U/L (ref 45–117)
ALT SERPL-CCNC: 29 U/L (ref 13–61)
ANION GAP SERPL CALC-SCNC: 6 MMOL/L (ref 8–16)
APTT BLD: 32.1 SECONDS (ref 25.2–36.5)
AST SERPL-CCNC: 27 U/L (ref 15–37)
BASOPHILS # BLD: 0.2 % (ref 0–2)
BILIRUB SERPL-MCNC: 1 MG/DL (ref 0.2–1)
BNP SERPL-MCNC: 592.4 PG/ML (ref 5–450)
BUN SERPL-MCNC: 22.5 MG/DL (ref 7–18)
CALCIUM SERPL-MCNC: 9.7 MG/DL (ref 8.5–10.1)
CHLORIDE SERPL-SCNC: 103 MMOL/L (ref 98–107)
CO2 SERPL-SCNC: 28 MMOL/L (ref 21–32)
CREAT SERPL-MCNC: 1.5 MG/DL (ref 0.55–1.3)
DEPRECATED RDW RBC AUTO: 13.7 % (ref 11.6–15.6)
EOSINOPHIL # BLD: 1.1 % (ref 0–4.5)
GLUCOSE SERPL-MCNC: 94 MG/DL (ref 74–106)
HCT VFR BLD CALC: 45.6 % (ref 32.4–45.2)
HGB BLD-MCNC: 15.5 GM/DL (ref 10.7–15.3)
INR BLD: 0.98 (ref 0.83–1.09)
LYMPHOCYTES # BLD: 37.2 % (ref 8–40)
MCH RBC QN AUTO: 30.9 PG (ref 25.7–33.7)
MCHC RBC AUTO-ENTMCNC: 34 G/DL (ref 32–36)
MCV RBC: 90.8 FL (ref 80–96)
MONOCYTES # BLD AUTO: 6.4 % (ref 3.8–10.2)
NEUTROPHILS # BLD: 55.1 % (ref 42.8–82.8)
PLATELET # BLD AUTO: 271 10^3/UL (ref 134–434)
PMV BLD: 6.9 FL (ref 7.5–11.1)
PROT SERPL-MCNC: 8.4 G/DL (ref 6.4–8.2)
PT PNL PPP: 12 SEC (ref 9.7–13)
RBC # BLD AUTO: 5.02 M/MM3 (ref 3.6–5.2)
SODIUM SERPL-SCNC: 137 MMOL/L (ref 136–145)
WBC # BLD AUTO: 7 K/MM3 (ref 4–10)

## 2021-10-01 PROCEDURE — U0003 INFECTIOUS AGENT DETECTION BY NUCLEIC ACID (DNA OR RNA); SEVERE ACUTE RESPIRATORY SYNDROME CORONAVIRUS 2 (SARS-COV-2) (CORONAVIRUS DISEASE [COVID-19]), AMPLIFIED PROBE TECHNIQUE, MAKING USE OF HIGH THROUGHPUT TECHNOLOGIES AS DESCRIBED BY CMS-2020-01-R: HCPCS

## 2021-10-01 PROCEDURE — C9803 HOPD COVID-19 SPEC COLLECT: HCPCS

## 2021-10-01 PROCEDURE — U0005 INFEC AGEN DETEC AMPLI PROBE: HCPCS

## 2021-10-02 VITALS — TEMPERATURE: 98 F | HEART RATE: 78 BPM | SYSTOLIC BLOOD PRESSURE: 148 MMHG | DIASTOLIC BLOOD PRESSURE: 56 MMHG

## 2021-10-02 LAB
ALBUMIN SERPL-MCNC: 3.9 G/DL (ref 3.4–5)
ALP SERPL-CCNC: 93 U/L (ref 45–117)
ALT SERPL-CCNC: 28 U/L (ref 13–61)
ANION GAP SERPL CALC-SCNC: 10 MMOL/L (ref 8–16)
APPEARANCE UR: CLEAR
AST SERPL-CCNC: 26 U/L (ref 15–37)
BACTERIA # UR AUTO: 149 /UL (ref 0–1359)
BILIRUB SERPL-MCNC: 1.1 MG/DL (ref 0.2–1)
BILIRUB UR STRIP.AUTO-MCNC: NEGATIVE MG/DL
BUN SERPL-MCNC: 24 MG/DL (ref 7–18)
CALCIUM SERPL-MCNC: 9.5 MG/DL (ref 8.5–10.1)
CASTS URNS QL MICRO: 1 /UL (ref 0–3.1)
CHLORIDE SERPL-SCNC: 101 MMOL/L (ref 98–107)
CHOLEST SERPL-MCNC: 163 MG/DL (ref 50–200)
CO2 SERPL-SCNC: 25 MMOL/L (ref 21–32)
COLOR UR: YELLOW
CREAT SERPL-MCNC: 1.3 MG/DL (ref 0.55–1.3)
DEPRECATED RDW RBC AUTO: 13.5 % (ref 11.6–15.6)
EPITH CASTS URNS QL MICRO: 11 /UL (ref 0–25.1)
GLUCOSE SERPL-MCNC: 149 MG/DL (ref 74–106)
HCT VFR BLD CALC: 43 % (ref 32.4–45.2)
HDLC SERPL-MCNC: 60 MG/DL (ref 40–60)
HGB BLD-MCNC: 14.8 GM/DL (ref 10.7–15.3)
KETONES UR QL STRIP: NEGATIVE
LDLC SERPL CALC-MCNC: 91 MG/DL (ref 5–100)
LEUKOCYTE ESTERASE UR QL STRIP.AUTO: NEGATIVE
MAGNESIUM SERPL-MCNC: 1.9 MG/DL (ref 1.8–2.4)
MCH RBC QN AUTO: 31.3 PG (ref 25.7–33.7)
MCHC RBC AUTO-ENTMCNC: 34.4 G/DL (ref 32–36)
MCV RBC: 90.8 FL (ref 80–96)
NITRITE UR QL STRIP: NEGATIVE
PH UR: 6 [PH] (ref 5–8)
PHOSPHATE SERPL-MCNC: 4.2 MG/DL (ref 2.5–4.9)
PLATELET # BLD AUTO: 245 10^3/UL (ref 134–434)
PMV BLD: 7.4 FL (ref 7.5–11.1)
PROT SERPL-MCNC: 7.5 G/DL (ref 6.4–8.2)
PROT UR QL STRIP: (no result)
PROT UR QL STRIP: NEGATIVE
RBC # BLD AUTO: 18 /UL (ref 0–23.9)
RBC # BLD AUTO: 4.74 M/MM3 (ref 3.6–5.2)
SODIUM SERPL-SCNC: 136 MMOL/L (ref 136–145)
SP GR UR: 1.02 (ref 1.01–1.03)
TRIGL SERPL-MCNC: 46 MG/DL (ref 0–150)
URATE SERPL-SCNC: 5.9 MG/DL (ref 2.6–7.2)
UROBILINOGEN UR STRIP-MCNC: 1 MG/DL (ref 0.2–1)
WBC # BLD AUTO: 6 K/MM3 (ref 4–10)
WBC # UR AUTO: 4 /UL (ref 0–25.8)

## 2021-10-02 RX ADMIN — HEPARIN SODIUM SCH UNIT: 5000 INJECTION, SOLUTION INTRAVENOUS; SUBCUTANEOUS at 14:07

## 2021-10-02 RX ADMIN — HEPARIN SODIUM SCH: 5000 INJECTION, SOLUTION INTRAVENOUS; SUBCUTANEOUS at 05:35

## 2022-03-14 ENCOUNTER — HOSPITAL ENCOUNTER (EMERGENCY)
Dept: HOSPITAL 74 - JERFT | Age: 77
Discharge: HOME | End: 2022-03-14
Payer: MEDICARE

## 2022-03-14 VITALS — SYSTOLIC BLOOD PRESSURE: 152 MMHG | HEART RATE: 68 BPM | TEMPERATURE: 97.5 F | DIASTOLIC BLOOD PRESSURE: 53 MMHG

## 2022-03-14 VITALS — BODY MASS INDEX: 23.8 KG/M2

## 2022-03-14 DIAGNOSIS — M94.0: Primary | ICD-10-CM

## 2022-11-29 ENCOUNTER — HOSPITAL ENCOUNTER (OUTPATIENT)
Dept: HOSPITAL 74 - JER | Age: 77
Setting detail: OBSERVATION
LOS: 1 days | Discharge: HOME | End: 2022-11-30
Attending: INTERNAL MEDICINE | Admitting: INTERNAL MEDICINE
Payer: COMMERCIAL

## 2022-11-29 VITALS — BODY MASS INDEX: 21.5 KG/M2

## 2022-11-29 DIAGNOSIS — I25.10: ICD-10-CM

## 2022-11-29 DIAGNOSIS — F17.210: ICD-10-CM

## 2022-11-29 DIAGNOSIS — J44.1: Primary | ICD-10-CM

## 2022-11-29 DIAGNOSIS — I11.9: ICD-10-CM

## 2022-11-29 LAB
ALBUMIN SERPL-MCNC: 4 G/DL (ref 3.4–5)
ALP SERPL-CCNC: 82 U/L (ref 45–117)
ALT SERPL-CCNC: 25 U/L (ref 13–61)
ANION GAP SERPL CALC-SCNC: 7 MMOL/L (ref 8–16)
ANISOCYTOSIS BLD QL: 0
APTT BLD: 38.3 SECONDS (ref 25.2–36.5)
AST SERPL-CCNC: 37 U/L (ref 15–37)
BILIRUB SERPL-MCNC: 1.2 MG/DL (ref 0.2–1)
BNP SERPL-MCNC: 317.6 PG/ML (ref 5–450)
BUN SERPL-MCNC: 33.4 MG/DL (ref 7–18)
CALCIUM SERPL-MCNC: 9.7 MG/DL (ref 8.5–10.1)
CHLORIDE SERPL-SCNC: 106 MMOL/L (ref 98–107)
CO2 SERPL-SCNC: 27 MMOL/L (ref 21–32)
CREAT SERPL-MCNC: 1.5 MG/DL (ref 0.55–1.3)
DEPRECATED RDW RBC AUTO: 14 % (ref 11.6–15.6)
GLUCOSE SERPL-MCNC: 73 MG/DL (ref 74–106)
HCT VFR BLD CALC: 46.3 % (ref 32.4–45.2)
HGB BLD-MCNC: 15.2 GM/DL (ref 10.7–15.3)
INR BLD: 1.03 (ref 0.83–1.09)
MACROCYTES BLD QL: 0
MCH RBC QN AUTO: 30 PG (ref 25.7–33.7)
MCHC RBC AUTO-ENTMCNC: 32.8 G/DL (ref 32–36)
MCV RBC: 91.6 FL (ref 80–96)
PLATELET # BLD AUTO: 315 10^3/UL (ref 134–434)
PMV BLD: 7.4 FL (ref 7.5–11.1)
PROT SERPL-MCNC: 7.7 G/DL (ref 6.4–8.2)
PT PNL PPP: 11.9 SEC (ref 9.7–13)
RBC # BLD AUTO: 5.06 M/MM3 (ref 3.6–5.2)
SODIUM SERPL-SCNC: 141 MMOL/L (ref 136–145)
WBC # BLD AUTO: 6 K/MM3 (ref 4–10)

## 2022-11-29 PROCEDURE — G0378 HOSPITAL OBSERVATION PER HR: HCPCS

## 2022-11-29 PROCEDURE — 3E0F7GC INTRODUCTION OF OTHER THERAPEUTIC SUBSTANCE INTO RESPIRATORY TRACT, VIA NATURAL OR ARTIFICIAL OPENING: ICD-10-PCS | Performed by: INTERNAL MEDICINE

## 2022-11-29 PROCEDURE — 3E023GC INTRODUCTION OF OTHER THERAPEUTIC SUBSTANCE INTO MUSCLE, PERCUTANEOUS APPROACH: ICD-10-PCS | Performed by: INTERNAL MEDICINE

## 2022-11-29 PROCEDURE — 3E0337Z INTRODUCTION OF ELECTROLYTIC AND WATER BALANCE SUBSTANCE INTO PERIPHERAL VEIN, PERCUTANEOUS APPROACH: ICD-10-PCS | Performed by: INTERNAL MEDICINE

## 2022-11-29 RX ADMIN — HEPARIN SODIUM SCH UNIT: 5000 INJECTION, SOLUTION INTRAVENOUS; SUBCUTANEOUS at 16:33

## 2022-11-29 RX ADMIN — HEPARIN SODIUM SCH UNIT: 5000 INJECTION, SOLUTION INTRAVENOUS; SUBCUTANEOUS at 21:43

## 2022-11-30 VITALS
HEART RATE: 87 BPM | RESPIRATION RATE: 23 BRPM | SYSTOLIC BLOOD PRESSURE: 134 MMHG | TEMPERATURE: 98.2 F | DIASTOLIC BLOOD PRESSURE: 80 MMHG

## 2022-11-30 LAB
ALBUMIN SERPL-MCNC: 3.6 G/DL (ref 3.4–5)
ALP SERPL-CCNC: 71 U/L (ref 45–117)
ALT SERPL-CCNC: 22 U/L (ref 13–61)
ANION GAP SERPL CALC-SCNC: 6 MMOL/L (ref 8–16)
AST SERPL-CCNC: 19 U/L (ref 15–37)
BILIRUB SERPL-MCNC: 1 MG/DL (ref 0.2–1)
BUN SERPL-MCNC: 33.7 MG/DL (ref 7–18)
CALCIUM SERPL-MCNC: 9.2 MG/DL (ref 8.5–10.1)
CHLORIDE SERPL-SCNC: 106 MMOL/L (ref 98–107)
CHOLEST SERPL-MCNC: 175 MG/DL (ref 50–200)
CO2 SERPL-SCNC: 28 MMOL/L (ref 21–32)
CREAT SERPL-MCNC: 1.4 MG/DL (ref 0.55–1.3)
DEPRECATED RDW RBC AUTO: 13.8 % (ref 11.6–15.6)
GLUCOSE SERPL-MCNC: 81 MG/DL (ref 74–106)
HCT VFR BLD CALC: 43.3 % (ref 32.4–45.2)
HDLC SERPL-MCNC: 62 MG/DL (ref 40–60)
HGB BLD-MCNC: 14.1 GM/DL (ref 10.7–15.3)
LDLC SERPL CALC-MCNC: 103 MG/DL (ref 5–100)
MAGNESIUM SERPL-MCNC: 1.9 MG/DL (ref 1.8–2.4)
MCH RBC QN AUTO: 30 PG (ref 25.7–33.7)
MCHC RBC AUTO-ENTMCNC: 32.6 G/DL (ref 32–36)
MCV RBC: 91.9 FL (ref 80–96)
PHOSPHATE SERPL-MCNC: 3.8 MG/DL (ref 2.5–4.9)
PLATELET # BLD AUTO: 290 10^3/UL (ref 134–434)
PMV BLD: 8 FL (ref 7.5–11.1)
PROT SERPL-MCNC: 6.8 G/DL (ref 6.4–8.2)
RBC # BLD AUTO: 4.71 M/MM3 (ref 3.6–5.2)
SODIUM SERPL-SCNC: 140 MMOL/L (ref 136–145)
TRIGL SERPL-MCNC: 72 MG/DL (ref 0–150)
WBC # BLD AUTO: 4.9 K/MM3 (ref 4–10)

## 2022-11-30 RX ADMIN — HEPARIN SODIUM SCH UNIT: 5000 INJECTION, SOLUTION INTRAVENOUS; SUBCUTANEOUS at 05:40

## 2023-07-18 ENCOUNTER — HOSPITAL ENCOUNTER (OUTPATIENT)
Dept: HOSPITAL 74 - JER | Age: 78
Setting detail: OBSERVATION
LOS: 3 days | Discharge: HOME | End: 2023-07-21
Attending: STUDENT IN AN ORGANIZED HEALTH CARE EDUCATION/TRAINING PROGRAM | Admitting: STUDENT IN AN ORGANIZED HEALTH CARE EDUCATION/TRAINING PROGRAM
Payer: COMMERCIAL

## 2023-07-18 VITALS — BODY MASS INDEX: 21.5 KG/M2

## 2023-07-18 DIAGNOSIS — R06.02: Primary | ICD-10-CM

## 2023-07-18 DIAGNOSIS — I12.9: ICD-10-CM

## 2023-07-18 DIAGNOSIS — Z72.0: ICD-10-CM

## 2023-07-18 DIAGNOSIS — E78.5: ICD-10-CM

## 2023-07-18 DIAGNOSIS — N18.9: ICD-10-CM

## 2023-07-18 DIAGNOSIS — J44.9: ICD-10-CM

## 2023-07-18 LAB
ALBUMIN SERPL-MCNC: 4 G/DL (ref 3.4–5)
ALP SERPL-CCNC: 56 U/L (ref 45–117)
ALT SERPL-CCNC: 29 U/L (ref 13–61)
ANION GAP SERPL CALC-SCNC: 6 MMOL/L (ref 8–16)
APTT BLD: 35.2 SECONDS (ref 25.2–36.5)
AST SERPL-CCNC: 25 U/L (ref 15–37)
BASOPHILS # BLD: 1.4 % (ref 0–2)
BILIRUB SERPL-MCNC: 1.1 MG/DL (ref 0.2–1)
BNP SERPL-MCNC: 342.1 PG/ML (ref 5–450)
BUN SERPL-MCNC: 28.7 MG/DL (ref 7–18)
CALCIUM SERPL-MCNC: 9.9 MG/DL (ref 8.5–10.1)
CHLORIDE SERPL-SCNC: 102 MMOL/L (ref 98–107)
CO2 SERPL-SCNC: 31 MMOL/L (ref 21–32)
CREAT SERPL-MCNC: 1.6 MG/DL (ref 0.55–1.3)
DEPRECATED RDW RBC AUTO: 13.6 % (ref 11.6–15.6)
EOSINOPHIL # BLD: 2.2 % (ref 0–4.5)
GLUCOSE SERPL-MCNC: 88 MG/DL (ref 74–106)
HCT VFR BLD CALC: 44.5 % (ref 32.4–45.2)
HGB BLD-MCNC: 14.8 GM/DL (ref 10.7–15.3)
INR BLD: 1.02 (ref 0.83–1.09)
LYMPHOCYTES # BLD: 39.1 % (ref 8–40)
MCH RBC QN AUTO: 30.2 PG (ref 25.7–33.7)
MCHC RBC AUTO-ENTMCNC: 33.1 G/DL (ref 32–36)
MCV RBC: 91 FL (ref 80–96)
MONOCYTES # BLD AUTO: 8 % (ref 3.8–10.2)
NEUTROPHILS # BLD: 49.3 % (ref 42.8–82.8)
PLATELET # BLD AUTO: 290 10^3/UL (ref 134–434)
PMV BLD: 7.9 FL (ref 7.5–11.1)
POTASSIUM SERPLBLD-SCNC: 4.9 MMOL/L (ref 3.5–5.1)
PROT SERPL-MCNC: 7.4 G/DL (ref 6.4–8.2)
PT PNL PPP: 11.8 SEC (ref 9.7–13)
RBC # BLD AUTO: 4.89 M/MM3 (ref 3.6–5.2)
SODIUM SERPL-SCNC: 139 MMOL/L (ref 136–145)
WBC # BLD AUTO: 6 K/MM3 (ref 4–10)

## 2023-07-18 PROCEDURE — 3E0F7GC INTRODUCTION OF OTHER THERAPEUTIC SUBSTANCE INTO RESPIRATORY TRACT, VIA NATURAL OR ARTIFICIAL OPENING: ICD-10-PCS | Performed by: STUDENT IN AN ORGANIZED HEALTH CARE EDUCATION/TRAINING PROGRAM

## 2023-07-18 PROCEDURE — G0378 HOSPITAL OBSERVATION PER HR: HCPCS

## 2023-07-18 PROCEDURE — A9539 TC99M PENTETATE: HCPCS

## 2023-07-18 PROCEDURE — A9540 TC99M MAA: HCPCS

## 2023-07-18 RX ADMIN — ROSUVASTATIN CALCIUM SCH MG: 10 TABLET, FILM COATED ORAL at 21:44

## 2023-07-18 RX ADMIN — IPRATROPIUM BROMIDE AND ALBUTEROL SULFATE SCH AMP: .5; 3 SOLUTION RESPIRATORY (INHALATION) at 10:57

## 2023-07-18 RX ADMIN — IPRATROPIUM BROMIDE AND ALBUTEROL SULFATE SCH AMP: .5; 3 SOLUTION RESPIRATORY (INHALATION) at 11:09

## 2023-07-18 RX ADMIN — IPRATROPIUM BROMIDE AND ALBUTEROL SULFATE SCH AMP: .5; 3 SOLUTION RESPIRATORY (INHALATION) at 11:15

## 2023-07-18 RX ADMIN — BUDESONIDE AND FORMOTEROL FUMARATE DIHYDRATE SCH: 80; 4.5 AEROSOL RESPIRATORY (INHALATION) at 21:45

## 2023-07-18 RX ADMIN — INSULIN ASPART SCH: 100 INJECTION, SOLUTION INTRAVENOUS; SUBCUTANEOUS at 21:50

## 2023-07-18 RX ADMIN — INSULIN ASPART SCH: 100 INJECTION, SOLUTION INTRAVENOUS; SUBCUTANEOUS at 17:11

## 2023-07-19 LAB
ANION GAP SERPL CALC-SCNC: 6 MMOL/L (ref 8–16)
BUN SERPL-MCNC: 30.7 MG/DL (ref 7–18)
CALCIUM SERPL-MCNC: 9.7 MG/DL (ref 8.5–10.1)
CHLORIDE SERPL-SCNC: 104 MMOL/L (ref 98–107)
CO2 SERPL-SCNC: 31 MMOL/L (ref 21–32)
CREAT SERPL-MCNC: 1.5 MG/DL (ref 0.55–1.3)
DEPRECATED RDW RBC AUTO: 13.6 % (ref 11.6–15.6)
GLUCOSE SERPL-MCNC: 82 MG/DL (ref 74–106)
HCT VFR BLD CALC: 43.8 % (ref 32.4–45.2)
HGB BLD-MCNC: 14.9 GM/DL (ref 10.7–15.3)
MAGNESIUM SERPL-MCNC: 2.2 MG/DL (ref 1.8–2.4)
MCH RBC QN AUTO: 30.5 PG (ref 25.7–33.7)
MCHC RBC AUTO-ENTMCNC: 34.1 G/DL (ref 32–36)
MCV RBC: 89.6 FL (ref 80–96)
PLATELET # BLD AUTO: 251 10^3/UL (ref 134–434)
PMV BLD: 7.2 FL (ref 7.5–11.1)
POTASSIUM SERPLBLD-SCNC: 5.1 MMOL/L (ref 3.5–5.1)
RBC # BLD AUTO: 4.89 M/MM3 (ref 3.6–5.2)
SODIUM SERPL-SCNC: 141 MMOL/L (ref 136–145)
WBC # BLD AUTO: 5.9 K/MM3 (ref 4–10)

## 2023-07-19 RX ADMIN — AMLODIPINE BESYLATE SCH MG: 5 TABLET ORAL at 09:12

## 2023-07-19 RX ADMIN — INSULIN ASPART SCH: 100 INJECTION, SOLUTION INTRAVENOUS; SUBCUTANEOUS at 06:03

## 2023-07-19 RX ADMIN — INSULIN ASPART SCH UNITS: 100 INJECTION, SOLUTION INTRAVENOUS; SUBCUTANEOUS at 22:54

## 2023-07-19 RX ADMIN — INSULIN ASPART SCH: 100 INJECTION, SOLUTION INTRAVENOUS; SUBCUTANEOUS at 12:41

## 2023-07-19 RX ADMIN — BUDESONIDE AND FORMOTEROL FUMARATE DIHYDRATE SCH INH: 80; 4.5 AEROSOL RESPIRATORY (INHALATION) at 22:42

## 2023-07-19 RX ADMIN — ROSUVASTATIN CALCIUM SCH MG: 10 TABLET, FILM COATED ORAL at 22:42

## 2023-07-19 RX ADMIN — BUDESONIDE AND FORMOTEROL FUMARATE DIHYDRATE SCH INH: 80; 4.5 AEROSOL RESPIRATORY (INHALATION) at 09:13

## 2023-07-19 RX ADMIN — INSULIN ASPART SCH: 100 INJECTION, SOLUTION INTRAVENOUS; SUBCUTANEOUS at 16:38

## 2023-07-19 RX ADMIN — TIOTROPIUM BROMIDE INHALATION SPRAY SCH PUFF: 3.12 SPRAY, METERED RESPIRATORY (INHALATION) at 16:27

## 2023-07-19 RX ADMIN — LOSARTAN POTASSIUM SCH MG: 50 TABLET, FILM COATED ORAL at 09:12

## 2023-07-19 RX ADMIN — PREDNISONE SCH MG: 20 TABLET ORAL at 15:59

## 2023-07-20 RX ADMIN — ROSUVASTATIN CALCIUM SCH MG: 10 TABLET, FILM COATED ORAL at 23:11

## 2023-07-20 RX ADMIN — INSULIN ASPART SCH: 100 INJECTION, SOLUTION INTRAVENOUS; SUBCUTANEOUS at 11:21

## 2023-07-20 RX ADMIN — BUDESONIDE AND FORMOTEROL FUMARATE DIHYDRATE SCH INH: 80; 4.5 AEROSOL RESPIRATORY (INHALATION) at 10:17

## 2023-07-20 RX ADMIN — BUDESONIDE AND FORMOTEROL FUMARATE DIHYDRATE SCH INH: 80; 4.5 AEROSOL RESPIRATORY (INHALATION) at 23:12

## 2023-07-20 RX ADMIN — AMLODIPINE BESYLATE SCH MG: 5 TABLET ORAL at 10:17

## 2023-07-20 RX ADMIN — TIOTROPIUM BROMIDE INHALATION SPRAY SCH PUFF: 3.12 SPRAY, METERED RESPIRATORY (INHALATION) at 10:17

## 2023-07-20 RX ADMIN — INSULIN ASPART SCH: 100 INJECTION, SOLUTION INTRAVENOUS; SUBCUTANEOUS at 23:23

## 2023-07-20 RX ADMIN — LOSARTAN POTASSIUM SCH MG: 50 TABLET, FILM COATED ORAL at 10:16

## 2023-07-20 RX ADMIN — PREDNISONE SCH MG: 20 TABLET ORAL at 10:17

## 2023-07-20 RX ADMIN — INSULIN ASPART SCH: 100 INJECTION, SOLUTION INTRAVENOUS; SUBCUTANEOUS at 06:24

## 2023-07-20 RX ADMIN — INSULIN ASPART SCH: 100 INJECTION, SOLUTION INTRAVENOUS; SUBCUTANEOUS at 16:40

## 2023-07-21 VITALS
RESPIRATION RATE: 18 BRPM | SYSTOLIC BLOOD PRESSURE: 144 MMHG | HEART RATE: 78 BPM | DIASTOLIC BLOOD PRESSURE: 73 MMHG | TEMPERATURE: 98.2 F

## 2023-07-21 RX ADMIN — BUDESONIDE AND FORMOTEROL FUMARATE DIHYDRATE SCH INH: 80; 4.5 AEROSOL RESPIRATORY (INHALATION) at 09:22

## 2023-07-21 RX ADMIN — AMLODIPINE BESYLATE SCH MG: 5 TABLET ORAL at 09:20

## 2023-07-21 RX ADMIN — PREDNISONE SCH MG: 20 TABLET ORAL at 09:20

## 2023-07-21 RX ADMIN — TIOTROPIUM BROMIDE INHALATION SPRAY SCH PUFF: 3.12 SPRAY, METERED RESPIRATORY (INHALATION) at 09:22

## 2023-07-21 RX ADMIN — INSULIN ASPART SCH: 100 INJECTION, SOLUTION INTRAVENOUS; SUBCUTANEOUS at 07:07

## 2023-07-21 RX ADMIN — INSULIN ASPART SCH: 100 INJECTION, SOLUTION INTRAVENOUS; SUBCUTANEOUS at 11:15

## 2023-07-21 RX ADMIN — LOSARTAN POTASSIUM SCH MG: 50 TABLET, FILM COATED ORAL at 09:20

## 2025-07-03 ENCOUNTER — HOSPITAL ENCOUNTER (EMERGENCY)
Dept: HOSPITAL 74 - JER | Age: 80
Discharge: HOME | End: 2025-07-03
Payer: COMMERCIAL

## 2025-07-03 VITALS — RESPIRATION RATE: 16 BRPM

## 2025-07-03 VITALS — DIASTOLIC BLOOD PRESSURE: 61 MMHG | HEART RATE: 64 BPM | SYSTOLIC BLOOD PRESSURE: 132 MMHG | TEMPERATURE: 97.9 F

## 2025-07-03 VITALS — BODY MASS INDEX: 20.3 KG/M2

## 2025-07-03 DIAGNOSIS — I77.819: ICD-10-CM

## 2025-07-03 DIAGNOSIS — R10.13: Primary | ICD-10-CM

## 2025-07-03 DIAGNOSIS — R91.1: ICD-10-CM

## 2025-07-03 LAB
ALBUMIN SERPL-MCNC: 4.2 G/DL (ref 3.4–5)
ALP SERPL-CCNC: 71 U/L (ref 45–117)
ALT SERPL-CCNC: 26 U/L (ref 13–61)
ANION GAP SERPL CALC-SCNC: 1 MMOL/L (ref 4–13)
APTT BLD: 32.3 SECONDS (ref 25.2–36.5)
AST SERPL-CCNC: 21 U/L (ref 15–37)
BASOPHILS # BLD AUTO: 0.05 X10^3/UL (ref 0.01–0.08)
BILIRUB SERPL-MCNC: 1.3 MG/DL (ref 0.2–1)
BUN SERPL-MCNC: 32.8 MG/DL (ref 7–18)
CALCIUM SERPL-MCNC: 10 MG/DL (ref 8.5–10.1)
CHLORIDE SERPL-SCNC: 108 MMOL/L (ref 98–107)
CO2 SERPL-SCNC: 26 MMOL/L (ref 21–32)
CREAT SERPL-MCNC: 2.2 MG/DL (ref 0.55–1.3)
EOSINOPHIL # BLD AUTO: 0.11 X10^3/UL (ref 0.04–0.36)
EOSINOPHIL NFR BLD AUTO: 2.1 % (ref 0.7–5.8)
ERYTHROCYTE [DISTWIDTH] IN BLOOD: 12.1 % (ref 12.5–17)
GLUCOSE SERPL-MCNC: 105 MG/DL (ref 74–106)
HCT VFR BLD CALC: 50.8 % (ref 34.1–44.9)
HCV IGG SERPL QL IA: (no result)
HGB BLD-MCNC: 15.7 G/DL (ref 11.2–15.7)
HIV 1+2 AB+HIV1 P24 AG SERPL QL IA: NEGATIVE
IMM GRANULOCYTES # BLD: 0.01 X10^3/UL (ref 0–0.03)
INR BLD: 1.05 (ref 0.83–1.09)
MCHC RBC-ENTMCNC: 30.9 G/DL (ref 32.2–35.5)
MCV RBC: 96.2 FL (ref 79.4–94.8)
MONOCYTES # BLD AUTO: 0.52 X10^3/UL (ref 0.24–0.86)
MONOCYTES NFR BLD AUTO: 10.2 % (ref 4.7–12.5)
PLATELET # BLD AUTO: 285 X10^3/UL (ref 182–369)
PLATELETS.RETICULATED NFR BLD AUTO: (no result) % (ref 0.9–11.2)
PLATELETS.RETICULATED NFR BLD AUTO: (no result) X10^3/UL
PMV BLD: 8.6 FL (ref 9.4–12.3)
POTASSIUM SERPLBLD-SCNC: 4.8 MMOL/L (ref 3.5–5.1)
PROT SERPL-MCNC: 7.7 G/DL (ref 6.4–8.2)
PT PNL PPP: 11.5 SEC (ref 9.7–13)
SODIUM SERPL-SCNC: 135 MMOL/L (ref 136–145)

## 2025-07-03 PROCEDURE — 3E033NZ INTRODUCTION OF ANALGESICS, HYPNOTICS, SEDATIVES INTO PERIPHERAL VEIN, PERCUTANEOUS APPROACH: ICD-10-PCS

## 2025-07-03 RX ADMIN — SODIUM CHLORIDE ONE ML: 9 INJECTION, SOLUTION INTRAVENOUS at 13:53

## 2025-07-03 RX ADMIN — ACETAMINOPHEN ONE MG: 10 INJECTION, SOLUTION INTRAVENOUS at 12:48
